# Patient Record
Sex: MALE | Race: WHITE | NOT HISPANIC OR LATINO | Employment: UNEMPLOYED | URBAN - NONMETROPOLITAN AREA
[De-identification: names, ages, dates, MRNs, and addresses within clinical notes are randomized per-mention and may not be internally consistent; named-entity substitution may affect disease eponyms.]

---

## 2017-06-12 ENCOUNTER — HOSPITAL ENCOUNTER (EMERGENCY)
Facility: HOSPITAL | Age: 47
Discharge: PSYCHIATRIC OR SUBSTANCE USE FACILITY | End: 2017-06-12
Attending: EMERGENCY MEDICINE | Admitting: EMERGENCY MEDICINE

## 2017-06-12 ENCOUNTER — HOSPITAL ENCOUNTER (INPATIENT)
Facility: HOSPITAL | Age: 47
LOS: 9 days | Discharge: HOME | End: 2017-06-21
Attending: PSYCHIATRY & NEUROLOGY | Admitting: PSYCHIATRY & NEUROLOGY

## 2017-06-12 VITALS
DIASTOLIC BLOOD PRESSURE: 91 MMHG | HEART RATE: 80 BPM | HEIGHT: 70 IN | SYSTOLIC BLOOD PRESSURE: 154 MMHG | BODY MASS INDEX: 31.5 KG/M2 | OXYGEN SATURATION: 98 % | WEIGHT: 220 LBS | TEMPERATURE: 98.5 F | RESPIRATION RATE: 20 BRPM

## 2017-06-12 DIAGNOSIS — F29 PSYCHOSIS, UNSPECIFIED PSYCHOSIS TYPE (HCC): Primary | ICD-10-CM

## 2017-06-12 LAB
6-ACETYL MORPHINE: NEGATIVE
ALBUMIN SERPL-MCNC: 4.3 G/DL (ref 3.5–5)
ALBUMIN/GLOB SERPL: 1.6 G/DL (ref 1.5–2.5)
ALP SERPL-CCNC: 64 U/L (ref 40–129)
ALT SERPL W P-5'-P-CCNC: 26 U/L (ref 10–44)
AMPHET+METHAMPHET UR QL: NEGATIVE
ANION GAP SERPL CALCULATED.3IONS-SCNC: 4.9 MMOL/L (ref 3.6–11.2)
AST SERPL-CCNC: 20 U/L (ref 10–34)
BARBITURATES UR QL SCN: NEGATIVE
BASOPHILS # BLD AUTO: 0.02 10*3/MM3 (ref 0–0.3)
BASOPHILS NFR BLD AUTO: 0.2 % (ref 0–2)
BENZODIAZ UR QL SCN: NEGATIVE
BILIRUB SERPL-MCNC: 0.5 MG/DL (ref 0.2–1.8)
BILIRUB UR QL STRIP: NEGATIVE
BUN BLD-MCNC: 11 MG/DL (ref 7–21)
BUN/CREAT SERPL: 13.9 (ref 7–25)
BUPRENORPHINE SERPL-MCNC: NEGATIVE NG/ML
CALCIUM SPEC-SCNC: 9.3 MG/DL (ref 7.7–10)
CANNABINOIDS SERPL QL: POSITIVE
CHLORIDE SERPL-SCNC: 106 MMOL/L (ref 99–112)
CLARITY UR: ABNORMAL
CO2 SERPL-SCNC: 30.1 MMOL/L (ref 24.3–31.9)
COCAINE UR QL: NEGATIVE
COLOR UR: ABNORMAL
CREAT BLD-MCNC: 0.79 MG/DL (ref 0.43–1.29)
DEPRECATED RDW RBC AUTO: 39.7 FL (ref 37–54)
EOSINOPHIL # BLD AUTO: 0.01 10*3/MM3 (ref 0–0.7)
EOSINOPHIL NFR BLD AUTO: 0.1 % (ref 0–5)
ERYTHROCYTE [DISTWIDTH] IN BLOOD BY AUTOMATED COUNT: 12.7 % (ref 11.5–14.5)
ETHANOL BLD-MCNC: <10 MG/DL
ETHANOL UR QL: <0.01 %
GFR SERPL CREATININE-BSD FRML MDRD: 106 ML/MIN/1.73
GLOBULIN UR ELPH-MCNC: 2.7 GM/DL
GLUCOSE BLD-MCNC: 115 MG/DL (ref 70–110)
GLUCOSE UR STRIP-MCNC: ABNORMAL MG/DL
HCT VFR BLD AUTO: 42.8 % (ref 42–52)
HGB BLD-MCNC: 14.5 G/DL (ref 14–18)
HGB UR QL STRIP.AUTO: NEGATIVE
IMM GRANULOCYTES # BLD: 0.02 10*3/MM3 (ref 0–0.03)
IMM GRANULOCYTES NFR BLD: 0.2 % (ref 0–0.5)
KETONES UR QL STRIP: ABNORMAL
LEUKOCYTE ESTERASE UR QL STRIP.AUTO: NEGATIVE
LYMPHOCYTES # BLD AUTO: 1.53 10*3/MM3 (ref 1–3)
LYMPHOCYTES NFR BLD AUTO: 16.4 % (ref 21–51)
MCH RBC QN AUTO: 29.4 PG (ref 27–33)
MCHC RBC AUTO-ENTMCNC: 33.9 G/DL (ref 33–37)
MCV RBC AUTO: 86.6 FL (ref 80–94)
MDMA UR QL SCN: NEGATIVE
METHADONE UR QL SCN: NEGATIVE
MONOCYTES # BLD AUTO: 0.93 10*3/MM3 (ref 0.1–0.9)
MONOCYTES NFR BLD AUTO: 10 % (ref 0–10)
NEUTROPHILS # BLD AUTO: 6.83 10*3/MM3 (ref 1.4–6.5)
NEUTROPHILS NFR BLD AUTO: 73.1 % (ref 30–70)
NITRITE UR QL STRIP: NEGATIVE
OPIATES UR QL: NEGATIVE
OSMOLALITY SERPL CALC.SUM OF ELEC: 281.6 MOSM/KG (ref 273–305)
OXYCODONE UR QL SCN: NEGATIVE
PCP UR QL SCN: NEGATIVE
PH UR STRIP.AUTO: 6 [PH] (ref 5–8)
PLATELET # BLD AUTO: 366 10*3/MM3 (ref 130–400)
PMV BLD AUTO: 9.4 FL (ref 6–10)
POTASSIUM BLD-SCNC: 3.2 MMOL/L (ref 3.5–5.3)
PROT SERPL-MCNC: 7 G/DL (ref 6–8)
PROT UR QL STRIP: ABNORMAL
RBC # BLD AUTO: 4.94 10*6/MM3 (ref 4.7–6.1)
SODIUM BLD-SCNC: 141 MMOL/L (ref 135–153)
SP GR UR STRIP: 1.03 (ref 1–1.03)
UROBILINOGEN UR QL STRIP: ABNORMAL
WBC NRBC COR # BLD: 9.34 10*3/MM3 (ref 4.5–12.5)

## 2017-06-12 PROCEDURE — HZ2ZZZZ DETOXIFICATION SERVICES FOR SUBSTANCE ABUSE TREATMENT: ICD-10-PCS | Performed by: PSYCHIATRY & NEUROLOGY

## 2017-06-12 RX ORDER — IBUPROFEN 400 MG/1
600 TABLET ORAL EVERY 6 HOURS PRN
Status: DISCONTINUED | OUTPATIENT
Start: 2017-06-12 | End: 2017-06-21 | Stop reason: HOSPADM

## 2017-06-12 RX ORDER — NICOTINE 21 MG/24HR
1 PATCH, TRANSDERMAL 24 HOURS TRANSDERMAL DAILY
Status: DISCONTINUED | OUTPATIENT
Start: 2017-06-12 | End: 2017-06-21 | Stop reason: HOSPADM

## 2017-06-12 RX ORDER — HYDROXYZINE 50 MG/1
50 TABLET, FILM COATED ORAL EVERY 6 HOURS PRN
Status: DISCONTINUED | OUTPATIENT
Start: 2017-06-12 | End: 2017-06-21 | Stop reason: HOSPADM

## 2017-06-12 RX ORDER — BENZTROPINE MESYLATE 1 MG/ML
0.5 INJECTION INTRAMUSCULAR; INTRAVENOUS DAILY PRN
Status: DISCONTINUED | OUTPATIENT
Start: 2017-06-12 | End: 2017-06-21 | Stop reason: HOSPADM

## 2017-06-12 RX ORDER — ALUMINA, MAGNESIA, AND SIMETHICONE 2400; 2400; 240 MG/30ML; MG/30ML; MG/30ML
15 SUSPENSION ORAL EVERY 6 HOURS PRN
Status: DISCONTINUED | OUTPATIENT
Start: 2017-06-12 | End: 2017-06-21 | Stop reason: HOSPADM

## 2017-06-12 RX ORDER — BENZTROPINE MESYLATE 1 MG/1
1 TABLET ORAL DAILY PRN
Status: DISCONTINUED | OUTPATIENT
Start: 2017-06-12 | End: 2017-06-21 | Stop reason: HOSPADM

## 2017-06-12 RX ORDER — POTASSIUM CHLORIDE 20 MEQ/1
TABLET, EXTENDED RELEASE ORAL
Status: COMPLETED
Start: 2017-06-12 | End: 2017-06-12

## 2017-06-12 RX ORDER — BENZONATATE 100 MG/1
100 CAPSULE ORAL 3 TIMES DAILY PRN
Status: DISCONTINUED | OUTPATIENT
Start: 2017-06-12 | End: 2017-06-21 | Stop reason: HOSPADM

## 2017-06-12 RX ORDER — LOPERAMIDE HYDROCHLORIDE 2 MG/1
2 CAPSULE ORAL 4 TIMES DAILY PRN
Status: DISCONTINUED | OUTPATIENT
Start: 2017-06-12 | End: 2017-06-21 | Stop reason: HOSPADM

## 2017-06-12 RX ORDER — ECHINACEA PURPUREA EXTRACT 125 MG
2 TABLET ORAL AS NEEDED
Status: DISCONTINUED | OUTPATIENT
Start: 2017-06-12 | End: 2017-06-21 | Stop reason: HOSPADM

## 2017-06-12 RX ORDER — FAMOTIDINE 20 MG/1
20 TABLET, FILM COATED ORAL 2 TIMES DAILY PRN
Status: DISCONTINUED | OUTPATIENT
Start: 2017-06-12 | End: 2017-06-21 | Stop reason: HOSPADM

## 2017-06-12 RX ORDER — POTASSIUM CHLORIDE 20 MEQ/1
20 TABLET, EXTENDED RELEASE ORAL ONCE
Status: COMPLETED | OUTPATIENT
Start: 2017-06-12 | End: 2017-06-12

## 2017-06-12 RX ORDER — ONDANSETRON 4 MG/1
4 TABLET, FILM COATED ORAL EVERY 6 HOURS PRN
Status: DISCONTINUED | OUTPATIENT
Start: 2017-06-12 | End: 2017-06-21 | Stop reason: HOSPADM

## 2017-06-12 RX ORDER — MULTIVITAMIN
1 TABLET ORAL DAILY
Status: DISCONTINUED | OUTPATIENT
Start: 2017-06-12 | End: 2017-06-14

## 2017-06-12 RX ORDER — TRAZODONE HYDROCHLORIDE 50 MG/1
50 TABLET ORAL NIGHTLY PRN
Status: DISCONTINUED | OUTPATIENT
Start: 2017-06-12 | End: 2017-06-20

## 2017-06-12 RX ADMIN — TRAZODONE HYDROCHLORIDE 50 MG: 50 TABLET ORAL at 22:36

## 2017-06-12 RX ADMIN — Medication 1 TABLET: at 22:17

## 2017-06-12 RX ADMIN — POTASSIUM CHLORIDE 20 MEQ: 1500 TABLET, EXTENDED RELEASE ORAL at 17:33

## 2017-06-12 RX ADMIN — POTASSIUM CHLORIDE 20 MEQ: 1500 TABLET, EXTENDED RELEASE ORAL at 17:32

## 2017-06-12 RX ADMIN — NICOTINE 1 PATCH: 21 PATCH TRANSDERMAL at 22:18

## 2017-06-12 RX ADMIN — IBUPROFEN 600 MG: 400 TABLET, FILM COATED ORAL at 22:17

## 2017-06-12 NOTE — NURSING NOTE
Patient pockets emptied. Thorough search complete. Undergarments searched by intake nurse and was witnessed by Bunny in secruity Per ED policy 100. The patient was placed in hospital attire. Belongings were logged on personal belongings sheet. Room was swept for any potential safety hazards room cleared and patient placed in treatment diamond

## 2017-06-12 NOTE — ED PROVIDER NOTES
Subjective   Patient is a 46 y.o. male presenting with mental health disorder.   History provided by:  Patient   used: No    Mental Health Problem   Presenting symptoms: agitation, bizarre behavior, depression and disorganized thought process    Patient accompanied by:  Family member  Degree of incapacity (severity):  Moderate  Onset quality:  Sudden  Duration:  1 day  Timing:  Constant  Progression:  Unchanged  Chronicity:  Recurrent  Context: drug abuse    Treatment compliance:  Untreated  Relieved by:  None tried  Ineffective treatments:  None tried  Associated symptoms: anxiety and feelings of worthlessness    Associated symptoms: no chest pain and no headaches    Risk factors: no hx of mental illness        Review of Systems   Constitutional: Negative for chills and fever.   HENT: Negative for congestion, ear pain and sore throat.    Respiratory: Negative for cough, shortness of breath and wheezing.    Cardiovascular: Negative for chest pain.   Gastrointestinal: Negative for diarrhea, nausea and vomiting.   Genitourinary: Negative for dysuria and flank pain.   Skin: Negative for rash.   Neurological: Negative for headaches.   Psychiatric/Behavioral: Positive for agitation and behavioral problems. The patient is nervous/anxious.    All other systems reviewed and are negative.      Past Medical History:   Diagnosis Date   • Substance abuse        No Known Allergies    Past Surgical History:   Procedure Laterality Date   • BACK SURGERY         History reviewed. No pertinent family history.    Social History     Social History   • Marital status:      Spouse name: N/A   • Number of children: N/A   • Years of education: N/A     Social History Main Topics   • Smoking status: Current Every Day Smoker     Packs/day: 3.00     Years: 25.00   • Smokeless tobacco: None   • Alcohol use Yes      Comment: occasional   • Drug use: Yes     Special: Benzodiazepines, Marijuana, Opium      Comment:  methadone   • Sexual activity: Defer     Other Topics Concern   • None     Social History Narrative   • None           Objective   Physical Exam   Constitutional: He is oriented to person, place, and time. He appears well-developed and well-nourished.   HENT:   Head: Normocephalic.   Mouth/Throat: Oropharynx is clear and moist.   Neck: Neck supple.   Cardiovascular: Normal rate and regular rhythm.    Pulmonary/Chest: Effort normal and breath sounds normal.   Abdominal: Soft. Bowel sounds are normal. There is no tenderness.   Musculoskeletal: Normal range of motion.   Neurological: He is alert and oriented to person, place, and time.   Skin: Skin is warm and dry.   Psychiatric: His behavior is normal. Judgment and thought content normal. His mood appears anxious. He exhibits a depressed mood.   Nursing note and vitals reviewed.      Procedures         ED Course  ED Course                  MDM    Final diagnoses:   Psychosis, unspecified psychosis type            NORA Ann  06/12/17 8978

## 2017-06-13 ENCOUNTER — APPOINTMENT (OUTPATIENT)
Dept: CT IMAGING | Facility: HOSPITAL | Age: 47
End: 2017-06-13

## 2017-06-13 PROBLEM — R41.0 ACUTE DELIRIUM: Status: ACTIVE | Noted: 2017-06-13

## 2017-06-13 PROBLEM — F33.3 SEVERE EPISODE OF RECURRENT MAJOR DEPRESSIVE DISORDER, WITH PSYCHOTIC FEATURES (HCC): Status: ACTIVE | Noted: 2017-06-13

## 2017-06-13 PROBLEM — F11.221: Status: ACTIVE | Noted: 2017-06-13

## 2017-06-13 PROBLEM — F33.3 SEVERE EPISODE OF RECURRENT MAJOR DEPRESSIVE DISORDER, WITH PSYCHOTIC FEATURES: Status: ACTIVE | Noted: 2017-06-13

## 2017-06-13 LAB — POTASSIUM BLD-SCNC: 3.8 MMOL/L (ref 3.5–5.3)

## 2017-06-13 PROCEDURE — 70450 CT HEAD/BRAIN W/O DYE: CPT

## 2017-06-13 PROCEDURE — 99223 1ST HOSP IP/OBS HIGH 75: CPT | Performed by: PSYCHIATRY & NEUROLOGY

## 2017-06-13 PROCEDURE — 93005 ELECTROCARDIOGRAM TRACING: CPT | Performed by: PSYCHIATRY & NEUROLOGY

## 2017-06-13 PROCEDURE — 93010 ELECTROCARDIOGRAM REPORT: CPT | Performed by: INTERNAL MEDICINE

## 2017-06-13 PROCEDURE — 70450 CT HEAD/BRAIN W/O DYE: CPT | Performed by: RADIOLOGY

## 2017-06-13 PROCEDURE — 84132 ASSAY OF SERUM POTASSIUM: CPT | Performed by: PSYCHIATRY & NEUROLOGY

## 2017-06-13 RX ORDER — OLANZAPINE 5 MG/1
5 TABLET, ORALLY DISINTEGRATING ORAL
Status: DISCONTINUED | OUTPATIENT
Start: 2017-06-13 | End: 2017-06-21 | Stop reason: HOSPADM

## 2017-06-13 RX ORDER — OLANZAPINE 5 MG/1
5 TABLET, ORALLY DISINTEGRATING ORAL 2 TIMES DAILY
Status: DISCONTINUED | OUTPATIENT
Start: 2017-06-13 | End: 2017-06-20

## 2017-06-13 RX ORDER — LORAZEPAM 2 MG/1
2 TABLET ORAL EVERY 8 HOURS
Status: COMPLETED | OUTPATIENT
Start: 2017-06-13 | End: 2017-06-14

## 2017-06-13 RX ADMIN — Medication 1 TABLET: at 09:55

## 2017-06-13 RX ADMIN — LORAZEPAM 2 MG: 2 TABLET ORAL at 21:07

## 2017-06-13 RX ADMIN — IBUPROFEN 600 MG: 400 TABLET, FILM COATED ORAL at 21:09

## 2017-06-13 RX ADMIN — HYDROXYZINE HYDROCHLORIDE 50 MG: 50 TABLET ORAL at 00:55

## 2017-06-13 RX ADMIN — FAMOTIDINE 20 MG: 20 TABLET, FILM COATED ORAL at 05:44

## 2017-06-13 RX ADMIN — NICOTINE 1 PATCH: 21 PATCH TRANSDERMAL at 09:56

## 2017-06-13 RX ADMIN — OLANZAPINE 5 MG: 5 TABLET, ORALLY DISINTEGRATING ORAL at 16:49

## 2017-06-13 RX ADMIN — TRAZODONE HYDROCHLORIDE 50 MG: 50 TABLET ORAL at 21:07

## 2017-06-13 RX ADMIN — OLANZAPINE 5 MG: 5 TABLET, ORALLY DISINTEGRATING ORAL at 11:00

## 2017-06-13 NOTE — PLAN OF CARE
Problem:  Patient Care Overview (Adult)  Goal: Plan of Care Review  Outcome: Ongoing (interventions implemented as appropriate)  Pt rated anxiety and depression a 10. Pt stated he was not suicidal or homicidal but had rambling speech and FOI. Pt stated he was not homicidal but he knew people judged him so he judged them and he didn't know if that meant harm or not. Pt stated he was hearing voices continuously and seeing smiley faces and knew good was battling evil.    06/13/17 1517   Coping/Psychosocial Response Interventions   Plan Of Care Reviewed With patient   Coping/Psychosocial   Patient Agreement with Plan of Care agrees   Patient Care Overview   Progress no change         Problem:  Overarching Goals  Goal: Adheres to Safety Considerations for Self and Others  Outcome: Ongoing (interventions implemented as appropriate)  Goal: Optimized Coping Skills in Response to Life Stressors  Outcome: Ongoing (interventions implemented as appropriate)  Goal: Develops/Participates in Therapeutic [place] to Support Successful Transition  Outcome: Ongoing (interventions implemented as appropriate)

## 2017-06-13 NOTE — PLAN OF CARE
Problem:  Patient Care Overview (Adult)  Goal: Individualization and Mutuality  Outcome: Ongoing (interventions implemented as appropriate)    06/13/17 0979   Behavioral Health Screens   Patient Personal Strengths motivated for treatment;motivated for recovery;stable living environment;spiritual/Buddhist support;family/social support;positive educational history   Patient Vulnerabilities Ineffective coping skills; substance misuse   Individualization   Patient Specific Goals Patient will identify 1-2 healthy coping skills prior to discharge   Patient Specific Interventions Patient will be evaluated for medications; will be recommended for residential services. Patient will be offered 1-4 individual sessions 20-30 minutes per day and daily groups.

## 2017-06-13 NOTE — H&P
"Donna Gold RN   Admission Date: 6/12/2017  12:12 PM 06/13/17      Subjective     Chief Complaint:  Confusion, disorganized thought, depression, suicidal ideation, substance abuse      HPI:  Maurice Puga is a 46 y.o. male who was admitted for complaints of confusion, disorganized thought, depression, suicidal ideation. Patient presented to [facility] reporting he's been experiencing suicidal ideation to overdose, thoughts of wanting to be dead for the past 30 days. Reports history of depression for a number of years. During the past few weeks he's experienced worsening symptoms of , low mood, low motivation, irritability, feelings of hopelessness, worthlessness. During the interview,  Patient displays confusion, disorganized thought process, difficulty with concentration.  Patient's affect is blunted, often repeating  what he is questioned, answering inappropriately, illogical responses.  Patient does report difficulty coping with history of substance abuse beginning with marijuana and street drugs in High School.  He admits lately he's been very confused, depression, feeling suicidal. Reports \" I'm just not in control of myself anymore\".  He says he was in the hospital in [place] about 2 weeks ago for detoxification for Benzodiazepine and Opiate.  UDS is positive for marijuana. Patient reports occasional alcohol use. He denies drug use at this time but does admit to  history of  abusing drugs, including using IV route  and alcohol.   He says he was in the hospital in [place] about 2 weeks ago for detoxification for Benzodiazepine and Opiate.         Interview is difficult to complete, reliability of information is questionable related to patient's mental status. Collateral information has been gathered from chart and family.  We were able to contact Patient's Mother who states Patient has long history of drug abuse. She reported one previous inpatient treatment as an Adolescent r/t marijuana use. She " "says the patient has history of psychiatric treatment as an  Adult on outpatient basis with possible Diagnosis of Bipolar. Apparently she's had little contact with him in recent years and was unable to provide current information. We will continue to attempt to contact the patient's Wife.  Upon the Unit patient has admitted to hearing derogatory voices saying \" Look at what you've cause\". He also has  reported paranoid thought, feeling as if people at work have \"been out to get him\", talking about him.  He did initially report to staff he has experienced increased stressors, reporting his relationship strain with Wife and  Son has Leukemia.   He denies any homicidal ideations. He's reports poor sleep and appetite Denies hallucinations at this time. He was admitted to the Adult Psychiatric Unit for safety and further stabilization. Left message for his wife to call us.       Past Psych History: Patient reports history of recent inpatient detoxification treatment in [place], 2 weeks ago. Collateral information gather from Mother who reported at least 1 inpatient hospitalization as an Adolescent rt marijuana use.  Patient's Mother also reported he had outpatient treatment as an Adult with possible diagnosis of Bipolar.      Substance Abuse: UDS is positive for marijuana. Patient denies current use of drugs. Reported occasional alcohol use . Reports he was in an inpatient detoxification program about 2 weeks ago in [place].  He does admit to history of marijuana, Opiate, benzo, methadone.  Use has included IV route. Reportedly, Patient's began misusing  Opiates following back surgery in his 30's.      Family History: Depression in his mother. M,Grandfather alcohol,  Brother, alcohol. No history of mental illness or suicide.     Personal and social history   Patient is  has a 15 year old Daughter, Swathi and a 10 year old Son. He is high school educated, 5 years of college. 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 He states he grew up in [place] with Parents. His Father is now .  Denies legal history. Denies  history . Christian preference is Restorationism.   Medical/Surgical History:  Past Medical History:   Diagnosis Date   • Anxiety    • Chronic pain disorder     back pain   • Depression    • Substance abuse      Past Surgical History:   Procedure Laterality Date   • BACK SURGERY         No Known Allergies  Social History   Substance Use Topics   • Smoking status: Current Every Day Smoker     Packs/day: 3.00     Years: 25.00     Types: Cigarettes   • Smokeless tobacco: Current User   • Alcohol use Yes      Comment: occasional     Current Medications:   Current Facility-Administered Medications   Medication Dose Route Frequency Provider Last Rate Last Dose   • aluminum-magnesium hydroxide-simethicone (MAALOX MAX) 400-400-40 MG/5ML suspension 15 mL  15 mL Oral Q6H PRN Sean Ku MD       • benzonatate (TESSALON) capsule 100 mg  100 mg Oral TID PRN Sean Ku MD       • benztropine (COGENTIN) tablet 1 mg  1 mg Oral Daily PRN Sean Ku MD        Or   • benztropine (COGENTIN) injection 0.5 mg  0.5 mg Intramuscular Daily PRN Sean Ku MD       • famotidine (PEPCID) tablet 20 mg  20 mg Oral BID PRN Sean Ku MD   20 mg at 17 0544   • hydrOXYzine (ATARAX) tablet 50 mg  50 mg Oral Q6H PRN Sean Ku MD   50 mg at 17 0055   • ibuprofen (ADVIL,MOTRIN) tablet 600 mg  600 mg Oral Q6H PRN Sean Ku MD   600 mg at 17 2217   • loperamide (IMODIUM) capsule 2 mg  2 mg Oral 4x Daily PRN Sean Ku MD       • magnesium hydroxide (MILK OF MAGNESIA) suspension 2400 mg/10mL 10 mL  10 mL Oral Daily PRN Sean Ku MD       • multivitamin (DAILY VICENTE) tablet 1 tablet  1 tablet Oral Daily Sean Ku MD   1 tablet at 17 0955   • nicotine (NICODERM CQ) 21 MG/24HR patch 1 patch  1  patch Transdermal Daily Sean Ku MD   1 patch at 06/13/17 0956   • OLANZapine zydis (zyPREXA) disintegrating tablet 5 mg  5 mg Oral BID Ranjan King MD   5 mg at 06/13/17 1100   • OLANZapine zydis (zyPREXA) disintegrating tablet 5 mg  5 mg Oral Q48H PRN Ranjan King MD       • ondansetron (ZOFRAN) tablet 4 mg  4 mg Oral Q6H PRN Sean Ku MD       • pneumococcal polysaccharide 23-valent (PNEUMOVAX-23) vaccine 0.5 mL  0.5 mL Intramuscular During Hospitalization Sean Ku MD       • sodium chloride (OCEAN) nasal spray 2 spray  2 spray Each Nare PRN Sean Ku MD       • traZODone (DESYREL) tablet 50 mg  50 mg Oral Nightly PRN Sean Ku MD   50 mg at 06/12/17 2236       Review of Systems    Review of Systems - General ROS: negative for - chills, fever or malaise  Ophthalmic ROS: negative for - loss of vision  ENT ROS: negative for - hearing change  Allergy and Immunology ROS: negative for - hives  Hematological and Lymphatic ROS: negative for - bleeding problems  Endocrine ROS: negative for - skin changes  Respiratory ROS: no cough, shortness of breath, or wheezing  Cardiovascular ROS: no chest pain or dyspnea on exertion  Gastrointestinal ROS: no abdominal pain, change in bowel habits, or black or bloody stools  Genito-Urinary ROS: no dysuria, trouble voiding, or hematuria  Musculoskeletal ROS: negative for - gait disturbance  Neurological ROS: no TIA or stroke symptoms  Dermatological ROS: negative for rash    Objective       General Appearance:    Alert, cooperative, in no acute distress   Head:    Normocephalic, without obvious abnormality, atraumatic   Eyes:            Lids and lashes normal, conjunctivae and sclerae normal, no   icterus, no pallor, corneas clear, PERRLA   Ears:    Ears appear intact with no abnormalities noted   Throat:   No oral lesions, no thrush, oral mucosa moist   Neck:   No adenopathy, supple, trachea  "midline, no thyromegaly, no   carotid bruit, no JVD   Back:     No kyphosis present, no scoliosis present, no skin lesions,      erythema or scars, no tenderness to percussion or                   palpation,   range of motion normal   Lungs:     Clear to auscultation,respirations regular, even and                  unlabored    Heart:    Regular rhythm and normal rate, normal S1 and S2, no            murmur, no gallop, no rub, no click   Chest Wall:    No abnormalities observed   Abdomen:     Normal bowel sounds, no masses, no organomegaly, soft        non-tender, non-distended, no guarding, no rebound                tenderness   Rectal:     Deferred   Extremities:   Moves all extremities well, no edema, no cyanosis, no             redness   Pulses:   Pulses palpable and equal bilaterally   Skin:   No bleeding, bruising or rash   Lymph nodes:   No palpable adenopathy   Neurologic:   Cranial nerves 2 - 12 grossly intact, sensation intact, DTR       present and equal bilaterally       Blood pressure 149/86, pulse 51, temperature 98 °F (36.7 °C), temperature source Temporal Artery , resp. rate 16, height 70\" (177.8 cm), weight 199 lb 9.6 oz (90.5 kg), SpO2 98 %.    Mental Status Exam:   Hygiene:   fair  Cooperation:  Cooperative  Eye Contact:  Fair  Psychomotor Behavior:  Appropriate  Affect:  Blunted  Hopelessness: Denies  Speech:  Pressured  Thought Process:  Disorganized and Pressured  Thought Content:  Mood congurent  Suicidal:  None  Homicidal:  None  Hallucinations:  None  Delusion:  None  Memory:  Deficits  Orientation:  Person  Reliability:  poor  Insight:  Poor  Judgement:  Poor  Impulse Control:  Poor  Physical/Medical Issues:  No     Medical Decision Making:              Labs:                  Medications:                multivitamin 1 tablet Oral Daily   nicotine 1 patch Transdermal Daily   OLANZapine zydis 5 mg Oral BID                  •  aluminum-magnesium hydroxide-simethicone  •  benzonatate  •  " benztropine **OR** benztropine  •  famotidine  •  hydrOXYzine  •  ibuprofen  •  loperamide  •  magnesium hydroxide  •  OLANZapine zydis  •  ondansetron  •  pneumococcal polysaccharide 23-valent  •  sodium chloride  •  traZODone   All medications reviewed.    Special Precautions: Continue current level of Special Precautions.        Assessment/Plan     Patient Active Problem List   Diagnosis Code   • Acute delirium R41.0   • Polysubstance dependence including opioid type drug, continuous use, with delirium F11.221   • Severe episode of recurrent major depressive disorder, with psychotic features F33.3     The patient has been admitted to the [facility] for safety and symptom stabilization. The patient has been given routine orders and placed on special precautions. The patient will be assigned a Master Level Therapist.  A Psychiatrist  will assess the patient daily and work with the treatment team to develop a plan of care.       We discussed risks, benefits, and side effects of the above medication and the patient was agreeable with the plan.             Attestation:  I Donna Gold RN acted as scribe for Dr. MAHAD Phoenix                Physician Attestation: I attest that the above note accurately reflects work and decisions made by me.  KYM PHOENIX MD

## 2017-06-13 NOTE — PLAN OF CARE
Problem:  Patient Care Overview (Adult)  Goal: Discharge Needs Assessment  Outcome: Ongoing (interventions implemented as appropriate)    06/13/17 3803   Discharge Needs Assessment   Concerns To Be Addressed substance/tobacco abuse/use concerns;suicidal concerns   Community Agency Name(S) Patient is recommended for residential services   Current Discharge Risk substance abuse;psychiatric illness   Discharge Planning Comments Patient has insurance and should be able to access medications at discharge.   Discharge Needs Assessment   Outpatient/Agency/Support Group Needs residential services   Anticipated Discharge Disposition home with family  (Residential services)   Discharge Disposition home with family  (Residential services)   Living Environment   Transportation Available family or friend will provide         Data: Met with patient and introduced myself as therapist.  Patient was agreeable.  Patient is somewhat difficult to assess today due to confusion and disorganized thoughts.  Patient presents to the emergency room with suicidal ideation with a plan to overdose.  Patient has had thoughts of dying for the past 30 days.  Patient has suffered from depression for many years.  Patient has a history of substance abuse.  He has used IV that primarily snorts methadone; opiates; benzos.  Patient gave consent for me to contact his wife.  I contacted her and she stated that the patient went to a detox facility in [place] on May 24 and was discharged on May 31.  She stated that he was using 120 mg of methadone and was buying this off the street as well as up to 7 Xanax as per day. she does not know the milligrams.  She states that when he left the hospital they prescribed him benzos. the last benzos he took was on June 5.  She states that the patient went back to work on June 7 and was very stressed out after one day of work.  She states that he has always had a sharp mind that has most recently been feeling foggy and  having difficulty with his memory.  She states that he also reported blurred vision.  She stated that he has been sleep deprived getting approximately 1 hour at a time and only 3 hours per day.  I inquired if he had been using any alcohol recently and she stated that he had not been using any alcohol.  She did state that he uses the benzos from daylight to dark.  I'm concerned that he may possibly have be having  benzo withdrawal.  She stated that the patient has always suffered from anxiety.  She states that he does worry about their business.  She states that he also has family issues with his parents.  States that they have pretty much abandoned him due to his drug usage.  States that his mom is more of a trigger for him to use than a help.  States that the patient has a fear of them disowning him due to his illness.     Assessment: Patient continues to have disorganized thought pattern and appears to be confused.  Patient has blunted affect.  Patient often has difficulty following the conversation.  Patient has a long history of substance usage.  Patient feels paranoid and thinks that people are out to get him.  Spoke with patient's wife who stated that she is supportive and concerned for her spouse.     Plan: Patient remain the hospital for safety precautions.  Communicate with patient's family for collateral and discharge planning.  Will recommend residential treatment for the patient.

## 2017-06-14 PROCEDURE — 99232 SBSQ HOSP IP/OBS MODERATE 35: CPT | Performed by: PSYCHIATRY & NEUROLOGY

## 2017-06-14 RX ORDER — CHLORDIAZEPOXIDE HYDROCHLORIDE 25 MG/1
25 CAPSULE, GELATIN COATED ORAL 3 TIMES DAILY
Status: COMPLETED | OUTPATIENT
Start: 2017-06-14 | End: 2017-06-15

## 2017-06-14 RX ORDER — MULTIVITAMIN
1 TABLET ORAL DAILY
Status: DISCONTINUED | OUTPATIENT
Start: 2017-06-14 | End: 2017-06-21 | Stop reason: HOSPADM

## 2017-06-14 RX ORDER — CHLORDIAZEPOXIDE HYDROCHLORIDE 25 MG/1
25 CAPSULE, GELATIN COATED ORAL 3 TIMES DAILY
Status: DISCONTINUED | OUTPATIENT
Start: 2017-06-14 | End: 2017-06-14

## 2017-06-14 RX ORDER — THIAMINE HCL 50 MG
100 TABLET ORAL DAILY
Status: DISCONTINUED | OUTPATIENT
Start: 2017-06-14 | End: 2017-06-21 | Stop reason: HOSPADM

## 2017-06-14 RX ADMIN — THIAMINE HCL (VITAMIN B1) 50 MG TABLET 100 MG: at 09:32

## 2017-06-14 RX ADMIN — OLANZAPINE 5 MG: 5 TABLET, ORALLY DISINTEGRATING ORAL at 17:11

## 2017-06-14 RX ADMIN — LORAZEPAM 2 MG: 2 TABLET ORAL at 03:51

## 2017-06-14 RX ADMIN — CHLORDIAZEPOXIDE HYDROCHLORIDE 25 MG: 25 CAPSULE ORAL at 21:10

## 2017-06-14 RX ADMIN — CHLORDIAZEPOXIDE HYDROCHLORIDE 25 MG: 25 CAPSULE ORAL at 09:32

## 2017-06-14 RX ADMIN — CHLORDIAZEPOXIDE HYDROCHLORIDE 25 MG: 25 CAPSULE ORAL at 15:16

## 2017-06-14 RX ADMIN — OLANZAPINE 5 MG: 5 TABLET, ORALLY DISINTEGRATING ORAL at 08:38

## 2017-06-14 RX ADMIN — TRAZODONE HYDROCHLORIDE 50 MG: 50 TABLET ORAL at 21:10

## 2017-06-14 RX ADMIN — ALUMINUM HYDROXIDE, MAGNESIUM HYDROXIDE, AND DIMETHICONE 15 ML: 400; 400; 40 SUSPENSION ORAL at 05:55

## 2017-06-14 RX ADMIN — Medication 1 TABLET: at 09:32

## 2017-06-14 RX ADMIN — NICOTINE 1 PATCH: 21 PATCH TRANSDERMAL at 08:37

## 2017-06-14 RX ADMIN — Medication 1 TABLET: at 08:37

## 2017-06-14 NOTE — PLAN OF CARE
"Problem:  Patient Care Overview (Adult)  Goal: Interdisciplinary Rounds/Family Conference  Outcome: Ongoing (interventions implemented as appropriate)    06/14/17 1053   Interdisciplinary Rounds/Family Conf   Summary Staffed case with Dr King on this date. Patient appears to be suffering from delirium from benzo usage. Patient continues to be disorganized and paranoid on this date. Wife is supportive and will continue to communicate with her about the patient's progress.   Participants social work;nursing;psychiatrist         Data: Met with patient one-on-one continues to have disorganized thought pattern.  Patient has difficulty concentrating and remembering things even his basic is what he had for breakfast this morning.  When I asked the patient if he was feeling paranoid at all he said \"of course I'm being judged\" \"I tell patient that he was not being judged by anyone here and he said well \"at some point will be judged\".  Patient also told me when he entered the room that he heard the voice of his mother.  Patient states that he is embarased because he is having hallucinations.  Provided emotional support for the patient.     Contacted the patient's wife to give an update.  She stated that this time the patient is not to be allowed to come back home and that he is going to have to go to 30 day rehabilitation.  She stated that they have been in contact with [facility] in [place] that she is willing to see and him wherever he needs to go that would be an official for him and she doesn't care about the cost.     Plan: Patient will continue with medications to help with delirium with hallucinations.  I will continue to speak with the patient's wife for collateral.  Will also recommend residential treatment when patient is more coherent.      "

## 2017-06-14 NOTE — PROGRESS NOTES
"      PROGRESS NOTE  Clinician: KYM King MD  Admission Date: 6/12/2017  8:11 AM 06/14/17    Behavioral Health Treatment Plan and Problem List: I have reviewed and approved the Behavioral Health Treatment Plan and Problem list.    Allergies  No Known Allergies    Hospital Day: 2 days      Assessment completed within view of staff    History    CC: \"Feel better\"     Followed for: Acute dementia (withdrawal )/ Depression.     Interval HPI: Patient seen and evaluated by me.  Chart reviewed. Reviewed therapist note, seems BZ much more of an issue than previously thought. Up early this morning, rambling thoughts, says not depression, paranoid delusions and perceptions, oriented to time, place and person \"talking to doctor what your name\", active auditory and visual hallucination ('sun light bunching off my eye lashes\").       Evasive, avoiding mentioning his CD  Issue. His thoughts are disorganized, having trouble finding the next word or thoughts, almost like an expressive aphasia.   We'll continue with both the Zyprexa and lorazepam detox.    Interval Review of Systems: he  General ROS: negative for - fever or malaise  Endocrine ROS: negative for - palpitations  Respiratory ROS: no cough, shortness of breath, or wheezing  Cardiovascular ROS: no chest pain or dyspnea on exertion  Gastrointestinal ROS: no abdominal pain,no black or bloody stools    /85 (BP Location: Right arm, Patient Position: Lying)  Pulse 59  Temp 98.1 °F (36.7 °C) (Temporal Artery )   Resp 20  Ht 70\" (177.8 cm)  Wt 199 lb 9.6 oz (90.5 kg)  SpO2 98%  BMI 28.64 kg/m2    Mental Status Exam    Mood/Affect: depressed  Thought Processes:  disorganized, barely coherrent. words without conviction or commitment.  Echos clichés from prior treatment interventions.  Thought Content:  dilussional, paranoid and distorted  Hallucination(s): auditory and visual  Hopelessness: yes  Optimistic:minimally  Suicidal Thoughts:  none  Suicidal " Plan/Intent: none  Homicidal Thoughts:  absent      Medical Decision Making:   All recent completed LAB results reviewed and discussed with the patient.        Radiology:     Imaging Results (last 24 hours)     Procedure Component Value Units Date/Time    CT Head Without Contrast [[id number]] Collected:  06/13/17 1023     Updated:  06/13/17 1026    Narrative:       CT HEAD WO CONTRAST-     REASON FOR EXAM: mental status change      The ventricular system and subarachnoid spaces of the brain were  appropriate for age. There is no evidence of mass effect or midline  shift. No acute areas of hemorrhage or infarct are identified. There is  no evidence of subdural or epidural hematoma. No focal brain parenchymal  abnormalities were identified.       Impression:       Negative noncontrasted cranial CT. A source for the  patient's acute mental status change is not identified.           The radiation dose reduction device was utilized for each scan per the  ALARA (as low as reasonably achievable) protocol.     This report was finalized on 6/13/2017 10:24 AM by Dr. Jason Osborn II, MD.               EKG:     ECG/EMG Results (most recent)     Procedure Component Value Units Date/Time    ECG 12 Lead [[id number]] Collected:  06/13/17 1537     Updated:  06/13/17 1747    Narrative:       Test Reason : baseline  Blood Pressure : **/** mmHG  Vent. Rate : 053 BPM     Atrial Rate : 053 BPM     P-R Int : 152 ms          QRS Dur : 092 ms      QT Int : 442 ms       P-R-T Axes : 027 045 -12 degrees     QTc Int : 414 ms    Sinus bradycardia  Otherwise normal ECG  No previous ECGs available  Confirmed by Capri Treadwell ([id number]) on 6/13/2017 5:47:21 PM    Referred By:  LIZETT           Confirmed By:Capri Treadwell           Medications:     multivitamin 1 tablet Oral Daily   nicotine 1 patch Transdermal Daily   OLANZapine zydis 5 mg Oral BID       •  aluminum-magnesium hydroxide-simethicone  •  benzonatate  •  benztropine **OR**  benztropine  •  famotidine  •  hydrOXYzine  •  ibuprofen  •  loperamide  •  magnesium hydroxide  •  OLANZapine zydis  •  ondansetron  •  pneumococcal polysaccharide 23-valent  •  sodium chloride  •  traZODone   All medications reviewed.    Special Precautions: Continue current level of Special Precautions.    Assessment/Diagnosis: Active Problems:    Acute delirium    Polysubstance dependence including opioid type drug, continuous use, with delirium    Severe episode of recurrent major depressive disorder, with psychotic features      Treatment Plan: Continue current treatment plan.    Attestation:   Physician Attestation: I attest that the above note accurately reflects work and decisions made by me.

## 2017-06-14 NOTE — PLAN OF CARE
Problem: BH Patient Care Overview (Adult)  Goal: Plan of Care Review  Outcome: Ongoing (interventions implemented as appropriate)  Pt calm and cooperative this shift. Rates anxiety and depression a 0. Denies SI/H. Reports that he see's things floating around like science.    06/14/17 9958   Coping/Psychosocial Response Interventions   Plan Of Care Reviewed With patient   Coping/Psychosocial   Patient Agreement with Plan of Care agrees   Patient Care Overview   Progress improving         Problem:  Overarching Goals  Goal: Adheres to Safety Considerations for Self and Others  Outcome: Ongoing (interventions implemented as appropriate)  Goal: Optimized Coping Skills in Response to Life Stressors  Outcome: Ongoing (interventions implemented as appropriate)  Goal: Develops/Participates in Therapeutic [place] to Support Successful Transition  Outcome: Ongoing (interventions implemented as appropriate)

## 2017-06-14 NOTE — PLAN OF CARE
Problem:  Patient Care Overview (Adult)  Goal: Plan of Care Review  Outcome: Ongoing (interventions implemented as appropriate)    06/14/17 0417   Coping/Psychosocial Response Interventions   Plan Of Care Reviewed With patient   Coping/Psychosocial   Patient Agreement with Plan of Care agrees   Outcome Evaluation   Outcome Summary/Follow up Plan Patient calm and cooperative this shift. Rates anxiety and depression a 8/10. Denies SI/HI. Reports auditory hallucinations.    Patient Care Overview   Progress no change         Problem:  Overarching Goals  Goal: Adheres to Safety Considerations for Self and Others  Outcome: Ongoing (interventions implemented as appropriate)  Goal: Optimized Coping Skills in Response to Life Stressors  Outcome: Ongoing (interventions implemented as appropriate)  Goal: Develops/Participates in Therapeutic [place] to Support Successful Transition  Outcome: Ongoing (interventions implemented as appropriate)

## 2017-06-15 PROBLEM — F13.20 BENZODIAZEPINE DEPENDENCE: Status: ACTIVE | Noted: 2017-06-15

## 2017-06-15 PROBLEM — F31.64: Status: ACTIVE | Noted: 2017-06-13

## 2017-06-15 PROBLEM — F11.23 OPIOID DEPENDENCE WITH WITHDRAWAL: Status: ACTIVE | Noted: 2017-06-13

## 2017-06-15 PROBLEM — F13.931 BENZODIAZEPINE WITHDRAWAL WITH DELIRIUM (HCC): Status: ACTIVE | Noted: 2017-06-13

## 2017-06-15 PROBLEM — F13.931 BENZODIAZEPINE WITHDRAWAL WITH DELIRIUM (HCC): Status: ACTIVE | Noted: 2017-06-15

## 2017-06-15 PROBLEM — F12.10 CANNABIS ABUSE: Status: ACTIVE | Noted: 2017-06-15

## 2017-06-15 PROCEDURE — 99232 SBSQ HOSP IP/OBS MODERATE 35: CPT | Performed by: PSYCHIATRY & NEUROLOGY

## 2017-06-15 RX ORDER — DIVALPROEX SODIUM 500 MG/1
500 TABLET, EXTENDED RELEASE ORAL NIGHTLY
Status: DISCONTINUED | OUTPATIENT
Start: 2017-06-15 | End: 2017-06-21 | Stop reason: HOSPADM

## 2017-06-15 RX ADMIN — NICOTINE 1 PATCH: 21 PATCH TRANSDERMAL at 08:11

## 2017-06-15 RX ADMIN — CHLORDIAZEPOXIDE HYDROCHLORIDE 25 MG: 25 CAPSULE ORAL at 16:13

## 2017-06-15 RX ADMIN — OLANZAPINE 5 MG: 5 TABLET, ORALLY DISINTEGRATING ORAL at 17:47

## 2017-06-15 RX ADMIN — TRAZODONE HYDROCHLORIDE 50 MG: 50 TABLET ORAL at 20:24

## 2017-06-15 RX ADMIN — CHLORDIAZEPOXIDE HYDROCHLORIDE 25 MG: 25 CAPSULE ORAL at 08:13

## 2017-06-15 RX ADMIN — IBUPROFEN 600 MG: 400 TABLET, FILM COATED ORAL at 07:05

## 2017-06-15 RX ADMIN — METOPROLOL TARTRATE 25 MG: 25 TABLET, FILM COATED ORAL at 21:52

## 2017-06-15 RX ADMIN — CHLORDIAZEPOXIDE HYDROCHLORIDE 25 MG: 25 CAPSULE ORAL at 20:24

## 2017-06-15 RX ADMIN — OLANZAPINE 5 MG: 5 TABLET, ORALLY DISINTEGRATING ORAL at 08:11

## 2017-06-15 RX ADMIN — THIAMINE HCL (VITAMIN B1) 50 MG TABLET 100 MG: at 08:11

## 2017-06-15 RX ADMIN — DIVALPROEX SODIUM 500 MG: 500 TABLET, FILM COATED, EXTENDED RELEASE ORAL at 20:24

## 2017-06-15 RX ADMIN — Medication 1 TABLET: at 08:11

## 2017-06-15 NOTE — PROGRESS NOTES
"3    ID:aMurice Puga is a 46 y.o. male    CC: \"just struggling\"     HPI: He reviewed the H&P and progress notes from Dr. King.  The patient has had issues with polysubstance dependence however went through a recent detox.  UDS on admission was positive for THC only.  His PPD has been elevated but heart rate has been stable.  Today, the patient reports his last use of a benzo was over a week ago.  Many of his responses were odd for example when asked about what this place was called he said \"a place of healing.\"  He was oriented ×4.  He then started rambling about how he needed to get things right with his wife.  He denied auditory or visual hallucinations but mentioned that he has hallucinations \"during my dreams.\"  He reports racing thoughts, decreased need for sleep, increased impulsivity along with worsening depression and anxiety recently.    Depression rating 10/10  Anxiety rating 10/10  Sleep:reports 4 hours    On the way to the office, the patient had to be redirected several times to come to the appropriate place frequently distracted by others or spontaneously returning to his room.    Review of Systems   Cardiovascular: Negative.    Gastrointestinal: Negative.    Musculoskeletal: Positive for back pain.   Neurological: Negative.        Temp:  [97.4 °F (36.3 °C)-97.8 °F (36.6 °C)] 97.4 °F (36.3 °C)  Heart Rate:  [73-96] 79  Resp:  [16-18] 16  BP: (142-150)/(90-96) 142/90    MENTAL STATUS EXAM:  Appearance:Neatly, casually dressed, good hygeine.   Cooperation:Cooperative  Orientation: Ox4  Gait and station stable.   Psychomotor: No psychomotor agitation/retardation, No EPS, No motor tics  Speech- low volume, talkative but   Mood \"stressed\"   Affect-constricted  Thought Content- disorganized, slightly paranoid  Thought process- circumstantial   Suicidality: No SI  Homicidality: No HI  Perception: No AH/VH  Memory is intact for recent and remote events  Concentration: poor  Impulse " control-good  Insight-limited  Judgement- limited    Lab Results (last 24 hours)     ** No results found for the last 24 hours. **          ALLERGIES: Review of patient's allergies indicates no known allergies.      Current Facility-Administered Medications:   •  aluminum-magnesium hydroxide-simethicone (MAALOX MAX) 400-400-40 MG/5ML suspension 15 mL, 15 mL, Oral, Q6H PRN, Sean Ku MD, 15 mL at 06/14/17 0555  •  benzonatate (TESSALON) capsule 100 mg, 100 mg, Oral, TID PRN, Sean Ku MD  •  benztropine (COGENTIN) tablet 1 mg, 1 mg, Oral, Daily PRN **OR** benztropine (COGENTIN) injection 0.5 mg, 0.5 mg, Intramuscular, Daily PRN, Sean Ku MD  •  chlordiazePOXIDE (LIBRIUM) capsule 25 mg, 25 mg, Oral, TID, Ranjan King MD, 25 mg at 06/15/17 0813  •  famotidine (PEPCID) tablet 20 mg, 20 mg, Oral, BID PRN, Sean Ku MD, 20 mg at 06/13/17 0544  •  hydrOXYzine (ATARAX) tablet 50 mg, 50 mg, Oral, Q6H PRN, Sean Ku MD, 50 mg at 06/13/17 0055  •  ibuprofen (ADVIL,MOTRIN) tablet 600 mg, 600 mg, Oral, Q6H PRN, Sean Ku MD, 600 mg at 06/15/17 0705  •  loperamide (IMODIUM) capsule 2 mg, 2 mg, Oral, 4x Daily PRN, Sean Ku MD  •  magnesium hydroxide (MILK OF MAGNESIA) suspension 2400 mg/10mL 10 mL, 10 mL, Oral, Daily PRN, Sean Ku MD  •  multivitamin (DAILY VICENTE) tablet 1 tablet, 1 tablet, Oral, Daily, Ranjan King MD, 1 tablet at 06/15/17 0811  •  nicotine (NICODERM CQ) 21 MG/24HR patch 1 patch, 1 patch, Transdermal, Daily, Sean Ku MD, 1 patch at 06/15/17 0811  •  OLANZapine zydis (zyPREXA) disintegrating tablet 5 mg, 5 mg, Oral, BID, Ranjan King MD, 5 mg at 06/15/17 0811  •  OLANZapine zydis (zyPREXA) disintegrating tablet 5 mg, 5 mg, Oral, Q48H PRN, Ranjan King MD  •  ondansetron (ZOFRAN) tablet 4 mg, 4 mg, Oral, Q6H PRN, Sean Ku MD  •  pneumococcal  polysaccharide 23-valent (PNEUMOVAX-23) vaccine 0.5 mL, 0.5 mL, Intramuscular, During Hospitalization, Sean Ku MD  •  sodium chloride (OCEAN) nasal spray 2 spray, 2 spray, Each Nare, PRN, Sean Ku MD  •  traZODone (DESYREL) tablet 50 mg, 50 mg, Oral, Nightly PRN, Sean Ku MD, 50 mg at 06/14/17 2110  •  vitamin B-1 tablet 100 mg, 100 mg, Oral, Daily, Ranjan King MD, 100 mg at 06/15/17 0811      SAFETY PRECAUTIONS: SP LEVEL III    ASSESSMENT/PLAN:  Patient Active Problem List   Diagnosis   • Delirium   • Opioid dependence with withdrawal   • Bipolar I, most recent episode mixed, severe with psychotic behavior   • Benzodiazepine dependence   • Cannabis abuse         Plan: Continue hospitalization for safety and stabilization.  The patient is still disorganized.  On review of the patient's vitals and the history provided, it appears that the patient may not be delirious from benzodiazepine withdrawal or other substance use withdrawal; however it appears that symptoms may be related to a mixed leda.  The patient's agreeable to a trial of Depakote  mg tonight.  We will continue Zyprexa for now to also help with mood stabilization.      I have reviewed the treatment plan and agree with current plan.  Treatment was discussed with the patient who is agreeable to this treatment and plan.

## 2017-06-15 NOTE — PROGRESS NOTES
Therapist completed referrals on this date to [facility], [facility], [facility] and [facility] on this date.

## 2017-06-15 NOTE — PLAN OF CARE
"Problem: BH Patient Care Overview (Adult)  Goal: Plan of Care Review  Outcome: Ongoing (interventions implemented as appropriate)  Patient reports good appetite and fair sleep last night. Reports anxiety at 10/10 with no depression reported, Patient reports continued paranoia and when questioned about A/V hallucinations he stated \"Only with the nightmares about my loved ones.\" Patient reports cravings at 10/10 and withdrawal symptoms of leg cramps, fatigue and body aches. Will continue to monitor.    06/15/17 1503   Coping/Psychosocial Response Interventions   Plan Of Care Reviewed With patient   Coping/Psychosocial   Patient Agreement with Plan of Care agrees   Patient Care Overview   Progress improving       Goal: Interdisciplinary Rounds/Family Conference  Outcome: Ongoing (interventions implemented as appropriate)  Goal: Individualization and Mutuality  Outcome: Ongoing (interventions implemented as appropriate)  Goal: Discharge Needs Assessment  Outcome: Ongoing (interventions implemented as appropriate)    Problem:  Overarching Goals  Goal: Adheres to Safety Considerations for Self and Others  Outcome: Ongoing (interventions implemented as appropriate)  Goal: Optimized Coping Skills in Response to Life Stressors  Outcome: Ongoing (interventions implemented as appropriate)  Goal: Develops/Participates in Therapeutic [place] to Support Successful Transition  Outcome: Ongoing (interventions implemented as appropriate)      "

## 2017-06-15 NOTE — PROGRESS NOTES
Received a call from Ev from [facility] who reports that their psychiatrist does not feel that they can meet his psychiatric needs at this time.

## 2017-06-15 NOTE — DISCHARGE PLACEMENT REQUEST
"Maurice Puga (46 y.o. Male)     Date of Birth Social Security Number Address Home Phone MRN    1970  [address] [place] [address] [phone] [id number]    Syn[employer] Marital Status          None        Admission Date Admission Type Admitting Provider Attending Provider Department, Room/Bed    6/12/17 Emergency Sean Ku MD [place], Ranjan Gomez MD [facility] ADULT PSYCHIATRIC 2, 1043/1S    Discharge Date Discharge Disposition Discharge Destination                      Attending Provider: Ranjan King MD     Allergies:  No Known Allergies    Isolation:  None   Infection:  None   Code Status:  FULL    Ht:  70\" (177.8 cm)   Wt:  199 lb 9.6 oz (90.5 kg)    Admission Cmt:  None   Principal Problem:  None                Active Insurance as of 6/12/2017     Primary Coverage     Payor Plan Insurance Group Employer/Plan Group    ANTHEM BLUE CROSS ANTHEM BLUE CROSS BLUE SHIELD PPO [id number]     Payor Plan Address Payor Plan Phone Number Effective From Effective To    [address] [phone] 11/1/2015     [place] [address]       Subscriber Name Subscriber Birth Date Member ID       MAURICE PUGA 1970 [id number]                 Emergency Contacts      (Rel.) Home Phone Work Phone Mobile Phone    Kristin Puga (Spouse) [phone] -- --            Insurance Information                ANTHEM BLUE CROSS/ANTHEM BLUE CROSS BLUE SHIELD PPO Phone: [phone]    Subscriber: Maurice Puga Subscriber#: [id number]    Group#: [id number] Precert#:              History & Physical      Ranjan King MD at 6/13/2017  2:38 PM          Donna Gold RN   Admission Date: 6/12/2017  12:12 PM 06/13/17      Subjective     Chief Complaint:  Confusion, disorganized thought, depression, suicidal ideation, substance abuse      HPI:  Maurice Puga is a 46 y.o. male who was admitted for complaints of confusion, disorganized thought, " "depression, suicidal ideation. Patient presented to [facility] reporting he's been experiencing suicidal ideation to overdose, thoughts of wanting to be dead for the past 30 days. Reports history of depression for a number of years. During the past few weeks he's experienced worsening symptoms of , low mood, low motivation, irritability, feelings of hopelessness, worthlessness. During the interview,  Patient displays confusion, disorganized thought process, difficulty with concentration.  Patient's affect is blunted, often repeating  what he is questioned, answering inappropriately, illogical responses.  Patient does report difficulty coping with history of substance abuse beginning with marijuana and street drugs in High School.  He admits lately he's been very confused, depression, feeling suicidal. Reports \" I'm just not in control of myself anymore\".  He says he was in the hospital in [place] about 2 weeks ago for detoxification for Benzodiazepine and Opiate.  UDS is positive for marijuana. Patient reports occasional alcohol use. He denies drug use at this time but does admit to  history of  abusing drugs, including using IV route  and alcohol.   He says he was in the hospital in [place] about 2 weeks ago for detoxification for Benzodiazepine and Opiate.         Interview is difficult to complete, reliability of information is questionable related to patient's mental status. Collateral information has been gathered from chart and family.  We were able to contact Patient's Mother who states Patient has long history of drug abuse. She reported one previous inpatient treatment as an Adolescent r/t marijuana use. She says the patient has history of psychiatric treatment as an  Adult on outpatient basis with possible Diagnosis of Bipolar. Apparently she's had little contact with him in recent years and was unable to provide current information. We will continue to attempt to contact the patient's Wife.  Upon the " "Unit patient has admitted to hearing derogatory voices saying \" Look at what you've cause\". He also has  reported paranoid thought, feeling as if people at work have \"been out to get him\", talking about him.  He did initially report to staff he has experienced increased stressors, reporting his relationship strain with Wife and  Son has Leukemia.   He denies any homicidal ideations. He's reports poor sleep and appetite Denies hallucinations at this time. He was admitted to the Adult Psychiatric Unit for safety and further stabilization. Left message for his wife to call us.       Past Psych History: Patient reports history of recent inpatient detoxification treatment in [place], 2 weeks ago. Collateral information gather from Mother who reported at least 1 inpatient hospitalization as an Adolescent rt marijuana use.  Patient's Mother also reported he had outpatient treatment as an Adult with possible diagnosis of Bipolar.      Substance Abuse: UDS is positive for marijuana. Patient denies current use of drugs. Reported occasional alcohol use . Reports he was in an inpatient detoxification program about 2 weeks ago in [place].  He does admit to history of marijuana, Opiate, benzo, methadone.  Use has included IV route. Reportedly, Patient's began misusing  Opiates following back surgery in his 30's.      Family History: Depression in his mother. M,Grandfather alcohol,  Brother, alcohol. No history of mental illness or suicide.     Personal and social history   Patient is  has a 15 year old Daughter, Swathi and a 10 year old Son. He is high school educated, 5 years of college. 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 He states he grew up in [place] with Parents. His Father is now .  Denies legal history. Denies  history . Quaker preference is Restorationist.   Medical/Surgical History:  Past Medical History:   Diagnosis Date   • Anxiety    • Chronic pain disorder     back pain "   • Depression    • Substance abuse      Past Surgical History:   Procedure Laterality Date   • BACK SURGERY         No Known Allergies  Social History   Substance Use Topics   • Smoking status: Current Every Day Smoker     Packs/day: 3.00     Years: 25.00     Types: Cigarettes   • Smokeless tobacco: Current User   • Alcohol use Yes      Comment: occasional     Current Medications:   Current Facility-Administered Medications   Medication Dose Route Frequency Provider Last Rate Last Dose   • aluminum-magnesium hydroxide-simethicone (MAALOX MAX) 400-400-40 MG/5ML suspension 15 mL  15 mL Oral Q6H PRN Sean Ku MD       • benzonatate (TESSALON) capsule 100 mg  100 mg Oral TID PRN Sean Ku MD       • benztropine (COGENTIN) tablet 1 mg  1 mg Oral Daily PRN Sean Ku MD        Or   • benztropine (COGENTIN) injection 0.5 mg  0.5 mg Intramuscular Daily PRN Sean Ku MD       • famotidine (PEPCID) tablet 20 mg  20 mg Oral BID PRN Sean Ku MD   20 mg at 06/13/17 0544   • hydrOXYzine (ATARAX) tablet 50 mg  50 mg Oral Q6H PRN Sean Ku MD   50 mg at 06/13/17 0055   • ibuprofen (ADVIL,MOTRIN) tablet 600 mg  600 mg Oral Q6H PRN Sean Ku MD   600 mg at 06/12/17 2217   • loperamide (IMODIUM) capsule 2 mg  2 mg Oral 4x Daily PRN Sean Ku MD       • magnesium hydroxide (MILK OF MAGNESIA) suspension 2400 mg/10mL 10 mL  10 mL Oral Daily PRN Sean Ku MD       • multivitamin (DAILY VICENTE) tablet 1 tablet  1 tablet Oral Daily Sean Ku MD   1 tablet at 06/13/17 0955   • nicotine (NICODERM CQ) 21 MG/24HR patch 1 patch  1 patch Transdermal Daily Sean Ku MD   1 patch at 06/13/17 0956   • OLANZapine zydis (zyPREXA) disintegrating tablet 5 mg  5 mg Oral BID Ranjan King MD   5 mg at 06/13/17 1100   • OLANZapine zydis (zyPREXA) disintegrating tablet 5 mg  5 mg Oral Q48H PRN Ranjan King,  MD       • ondansetron (ZOFRAN) tablet 4 mg  4 mg Oral Q6H PRN Sean Ku MD       • pneumococcal polysaccharide 23-valent (PNEUMOVAX-23) vaccine 0.5 mL  0.5 mL Intramuscular During Hospitalization Sean Ku MD       • sodium chloride (OCEAN) nasal spray 2 spray  2 spray Each Nare PRN Sean Ku MD       • traZODone (DESYREL) tablet 50 mg  50 mg Oral Nightly PRN Sean Ku MD   50 mg at 06/12/17 9086       Review of Systems    Review of Systems - General ROS: negative for - chills, fever or malaise  Ophthalmic ROS: negative for - loss of vision  ENT ROS: negative for - hearing change  Allergy and Immunology ROS: negative for - hives  Hematological and Lymphatic ROS: negative for - bleeding problems  Endocrine ROS: negative for - skin changes  Respiratory ROS: no cough, shortness of breath, or wheezing  Cardiovascular ROS: no chest pain or dyspnea on exertion  Gastrointestinal ROS: no abdominal pain, change in bowel habits, or black or bloody stools  Genito-Urinary ROS: no dysuria, trouble voiding, or hematuria  Musculoskeletal ROS: negative for - gait disturbance  Neurological ROS: no TIA or stroke symptoms  Dermatological ROS: negative for rash    Objective       General Appearance:    Alert, cooperative, in no acute distress   Head:    Normocephalic, without obvious abnormality, atraumatic   Eyes:            Lids and lashes normal, conjunctivae and sclerae normal, no   icterus, no pallor, corneas clear, PERRLA   Ears:    Ears appear intact with no abnormalities noted   Throat:   No oral lesions, no thrush, oral mucosa moist   Neck:   No adenopathy, supple, trachea midline, no thyromegaly, no   carotid bruit, no JVD   Back:     No kyphosis present, no scoliosis present, no skin lesions,      erythema or scars, no tenderness to percussion or                   palpation,   range of motion normal   Lungs:     Clear to auscultation,respirations regular, even and               "    unlabored    Heart:    Regular rhythm and normal rate, normal S1 and S2, no            murmur, no gallop, no rub, no click   Chest Wall:    No abnormalities observed   Abdomen:     Normal bowel sounds, no masses, no organomegaly, soft        non-tender, non-distended, no guarding, no rebound                tenderness   Rectal:     Deferred   Extremities:   Moves all extremities well, no edema, no cyanosis, no             redness   Pulses:   Pulses palpable and equal bilaterally   Skin:   No bleeding, bruising or rash   Lymph nodes:   No palpable adenopathy   Neurologic:   Cranial nerves 2 - 12 grossly intact, sensation intact, DTR       present and equal bilaterally       Blood pressure 149/86, pulse 51, temperature 98 °F (36.7 °C), temperature source Temporal Artery , resp. rate 16, height 70\" (177.8 cm), weight 199 lb 9.6 oz (90.5 kg), SpO2 98 %.    Mental Status Exam:   Hygiene:   fair  Cooperation:  Cooperative  Eye Contact:  Fair  Psychomotor Behavior:  Appropriate  Affect:  Blunted  Hopelessness: Denies  Speech:  Pressured  Thought Process:  Disorganized and Pressured  Thought Content:  Mood congurent  Suicidal:  None  Homicidal:  None  Hallucinations:  None  Delusion:  None  Memory:  Deficits  Orientation:  Person  Reliability:  poor  Insight:  Poor  Judgement:  Poor  Impulse Control:  Poor  Physical/Medical Issues:  No     Medical Decision Making:              Labs:                  Medications:                multivitamin 1 tablet Oral Daily   nicotine 1 patch Transdermal Daily   OLANZapine zydis 5 mg Oral BID                  •  aluminum-magnesium hydroxide-simethicone  •  benzonatate  •  benztropine **OR** benztropine  •  famotidine  •  hydrOXYzine  •  ibuprofen  •  loperamide  •  magnesium hydroxide  •  OLANZapine zydis  •  ondansetron  •  pneumococcal polysaccharide 23-valent  •  sodium chloride  •  traZODone   All medications reviewed.    Special Precautions: Continue current level of Special " "Precautions.        Assessment/Plan     Patient Active Problem List   Diagnosis Code   • Acute delirium R41.0   • Polysubstance dependence including opioid type drug, continuous use, with delirium F11.221   • Severe episode of recurrent major depressive disorder, with psychotic features F33.3     The patient has been admitted to the [facility] for safety and symptom stabilization. The patient has been given routine orders and placed on special precautions. The patient will be assigned a Master Level Therapist.  A Psychiatrist  will assess the patient daily and work with the treatment team to develop a plan of care.       We discussed risks, benefits, and side effects of the above medication and the patient was agreeable with the plan.             Attestation:  I Donna Gold RN acted as scribe for Dr. MAHAD Phoenix                Physician Attestation: I attest that the above note accurately reflects work and decisions made by me.  KYM PHOENIX MD       Electronically signed by Ranjan Phoenix MD at 6/13/2017  3:29 PM        Hospital Medications (active)       Dose Frequency Start End    aluminum-magnesium hydroxide-simethicone (MAALOX MAX) 400-400-40 MG/5ML suspension 15 mL 15 mL Every 6 Hours PRN 6/12/2017     Sig - Route: Take 15 mL by mouth Every 6 (Six) Hours As Needed for Heartburn. - Oral    benzonatate (TESSALON) capsule 100 mg 100 mg 3 Times Daily PRN 6/12/2017     Sig - Route: Take 1 capsule by mouth 3 (Three) Times a Day As Needed for Cough. - Oral    benztropine (COGENTIN) injection 0.5 mg 0.5 mg Daily PRN 6/12/2017     Sig - Route: Inject 0.5 mL into the shoulder, thigh, or buttocks Daily As Needed (tremors). - Intramuscular    Linked Group 1:  \"Or\" Linked Group Details        benztropine (COGENTIN) tablet 1 mg 1 mg Daily PRN 6/12/2017     Sig - Route: Take 1 tablet by mouth Daily As Needed for Tremors. - Oral    Linked Group 1:  \"Or\" Linked Group Details        chlordiazePOXIDE " (LIBRIUM) capsule 25 mg 25 mg 3 Times Daily 6/14/2017 6/16/2017    Sig - Route: Take 1 capsule by mouth 3 (Three) Times a Day. - Oral    famotidine (PEPCID) tablet 20 mg 20 mg 2 Times Daily PRN 6/12/2017     Sig - Route: Take 1 tablet by mouth 2 (Two) Times a Day As Needed for Heartburn. - Oral    hydrOXYzine (ATARAX) tablet 50 mg 50 mg Every 6 Hours PRN 6/12/2017     Sig - Route: Take 1 tablet by mouth Every 6 (Six) Hours As Needed for Anxiety. - Oral    ibuprofen (ADVIL,MOTRIN) tablet 600 mg 600 mg Every 6 Hours PRN 6/12/2017     Sig - Route: Take 1.5 tablets by mouth Every 6 (Six) Hours As Needed for Mild Pain (1-3) or Moderate Pain (4-6) (severe pain (7-10)). - Oral    loperamide (IMODIUM) capsule 2 mg 2 mg 4 Times Daily PRN 6/12/2017     Sig - Route: Take 1 capsule by mouth 4 (Four) Times a Day As Needed for Diarrhea. - Oral    magnesium hydroxide (MILK OF MAGNESIA) suspension 2400 mg/10mL 10 mL 10 mL Daily PRN 6/12/2017     Sig - Route: Take 10 mL by mouth Daily As Needed for Constipation. - Oral    multivitamin (DAILY VICENTE) tablet 1 tablet 1 tablet Daily 6/14/2017     Sig - Route: Take 1 tablet by mouth Daily. - Oral    nicotine (NICODERM CQ) 21 MG/24HR patch 1 patch 1 patch Daily 6/12/2017     Sig - Route: Place 1 patch on the skin Daily. - Transdermal    OLANZapine zydis (zyPREXA) disintegrating tablet 5 mg 5 mg 2 Times Daily 6/13/2017     Sig - Route: Take 1 tablet by mouth 2 (Two) Times a Day. - Oral    OLANZapine zydis (zyPREXA) disintegrating tablet 5 mg 5 mg Every 48 Hours PRN 6/13/2017     Sig - Route: Take 1 tablet by mouth Every Other Day As Needed (agressive agitation). - Oral    ondansetron (ZOFRAN) tablet 4 mg 4 mg Every 6 Hours PRN 6/12/2017     Sig - Route: Take 1 tablet by mouth Every 6 (Six) Hours As Needed for Nausea or Vomiting. - Oral    pneumococcal polysaccharide 23-valent (PNEUMOVAX-23) vaccine 0.5 mL 0.5 mL During Hospitalization 6/13/2017     Sig - Route: Inject 0.5 mL into the  "shoulder, thigh, or buttocks During Hospitalization for Immunization. - Intramuscular    sodium chloride (OCEAN) nasal spray 2 spray 2 spray As Needed 6/12/2017     Sig - Route: 2 sprays by Each Nare route As Needed for Congestion. - Each Nare    traZODone (DESYREL) tablet 50 mg 50 mg Nightly PRN 6/12/2017     Sig - Route: Take 1 tablet by mouth At Night As Needed for Sleep. - Oral    vitamin B-1 tablet 100 mg 100 mg Daily 6/14/2017     Sig - Route: Take 2 tablets by mouth Daily. - Oral             Physician Progress Notes (last 72 hours) (Notes from 6/12/2017 11:12 AM through 6/15/2017 11:12 AM)      Ranjan King MD at 6/14/2017  8:11 AM  Version 1 of 1               PROGRESS NOTE  Clinician: KYM King MD  Admission Date: 6/12/2017  8:11 AM 06/14/17    Behavioral Health Treatment Plan and Problem List: I have reviewed and approved the Behavioral Health Treatment Plan and Problem list.    Allergies  No Known Allergies    Hospital Day: 2 days      Assessment completed within view of staff    History    CC: \"Feel better\"     Followed for: Acute dementia (withdrawal )/ Depression.     Interval HPI: Patient seen and evaluated by me.  Chart reviewed. Reviewed therapist note, seems BZ much more of an issue than previously thought. Up early this morning, rambling thoughts, says not depression, paranoid delusions and perceptions, oriented to time, place and person \"talking to doctor what your name\", active auditory and visual hallucination ('sun light bunching off my eye lashes\").       Evasive, avoiding mentioning his CD  Issue. His thoughts are disorganized, having trouble finding the next word or thoughts, almost like an expressive aphasia.   We'll continue with both the Zyprexa and lorazepam detox.    Interval Review of Systems: he  General ROS: negative for - fever or malaise  Endocrine ROS: negative for - palpitations  Respiratory ROS: no cough, shortness of breath, or wheezing  Cardiovascular " "ROS: no chest pain or dyspnea on exertion  Gastrointestinal ROS: no abdominal pain,no black or bloody stools    /85 (BP Location: Right arm, Patient Position: Lying)  Pulse 59  Temp 98.1 °F (36.7 °C) (Temporal Artery )   Resp 20  Ht 70\" (177.8 cm)  Wt 199 lb 9.6 oz (90.5 kg)  SpO2 98%  BMI 28.64 kg/m2    Mental Status Exam    Mood/Affect: depressed  Thought Processes:  disorganized, barely coherrent. words without conviction or commitment.  Echos clichés from prior treatment interventions.  Thought Content:  dilussional, paranoid and distorted  Hallucination(s): auditory and visual  Hopelessness: yes  Optimistic:minimally  Suicidal Thoughts:  none  Suicidal Plan/Intent: none  Homicidal Thoughts:  absent      Medical Decision Making:   All recent completed LAB results reviewed and discussed with the patient.        Radiology:     Imaging Results (last 24 hours)     Procedure Component Value Units Date/Time    CT Head Without Contrast [[id number]] Collected:  06/13/17 1023     Updated:  06/13/17 1026    Narrative:       CT HEAD WO CONTRAST-     REASON FOR EXAM: mental status change      The ventricular system and subarachnoid spaces of the brain were  appropriate for age. There is no evidence of mass effect or midline  shift. No acute areas of hemorrhage or infarct are identified. There is  no evidence of subdural or epidural hematoma. No focal brain parenchymal  abnormalities were identified.       Impression:       Negative noncontrasted cranial CT. A source for the  patient's acute mental status change is not identified.           The radiation dose reduction device was utilized for each scan per the  ALARA (as low as reasonably achievable) protocol.     This report was finalized on 6/13/2017 10:24 AM by Dr. Jason Osborn II, MD.               EKG:     ECG/EMG Results (most recent)     Procedure Component Value Units Date/Time    ECG 12 Lead [[id number]] Collected:  06/13/17 1537     Updated:  06/13/17 " 1747    Narrative:       Test Reason : baseline  Blood Pressure : **/** mmHG  Vent. Rate : 053 BPM     Atrial Rate : 053 BPM     P-R Int : 152 ms          QRS Dur : 092 ms      QT Int : 442 ms       P-R-T Axes : 027 045 -12 degrees     QTc Int : 414 ms    Sinus bradycardia  Otherwise normal ECG  No previous ECGs available  Confirmed by Capri Treadwell ([id number]) on 6/13/2017 5:47:21 PM    Referred By:  LIZETT           Confirmed By:Capri Treadwell           Medications:     multivitamin 1 tablet Oral Daily   nicotine 1 patch Transdermal Daily   OLANZapine zydis 5 mg Oral BID       •  aluminum-magnesium hydroxide-simethicone  •  benzonatate  •  benztropine **OR** benztropine  •  famotidine  •  hydrOXYzine  •  ibuprofen  •  loperamide  •  magnesium hydroxide  •  OLANZapine zydis  •  ondansetron  •  pneumococcal polysaccharide 23-valent  •  sodium chloride  •  traZODone   All medications reviewed.    Special Precautions: Continue current level of Special Precautions.    Assessment/Diagnosis: Active Problems:    Acute delirium    Polysubstance dependence including opioid type drug, continuous use, with delirium    Severe episode of recurrent major depressive disorder, with psychotic features      Treatment Plan: Continue current treatment plan.    Attestation:   Physician Attestation: I attest that the above note accurately reflects work and decisions made by me.                 Electronically signed by Ranjan King MD at 6/14/2017  9:57 AM             Nursing Assessments (last 72 hours)      Psych PCS (Body System)       06/12/17 2215 06/13/17 0855 06/13/17 1020 06/13/17 1700 06/13/17 2020    Pain/Comfort/Sleep    Presence Of Pain complains of pain/discomfort complains of pain/discomfort complains of pain/discomfort  denies pain/discomfort    Preferred Pain Scale number (Numeric Rating Pain Scale) number (Numeric Rating Pain Scale) number (Numeric Rating Pain Scale)  number (Numeric Rating Pain Scale)     "Pain Body Location - Orientation   generalized      Pain Body Location back back       Pain Rating (0-10): Rest 10 2 --   \"can' really say\"  0    Pain Rating (0-10): Activity     0    Nonverbal Indicators Of Pain restless  restless      Comfort/Acceptable Pain Level 5        Pain Onset chronic  --   chronic      Pain Management Interventions pain care plan reviewed with patient/caregiver  diversional activity provided      Sleep/Rest/Relaxation feeling unrested;difficulty falling asleep  no problem identified  difficulty falling asleep;unable to stay asleep    Sleep Hygiene Promotion awakenings minimized;regular sleep pattern promoted  regular sleep pattern promoted      Pain/Comfort/Sleep Interventions    Sleep/Rest Enhancement awakenings minimized;consistent schedule promoted  awakenings minimized      Coping/Psychosocial    Verbalized Emotional State acceptance;anxiety;depression;powerlessness;hopelessness;suicidal thoughts;guilt  anxiety;depression  anxiety;depression    Plan Of Care Reviewed With patient  patient  patient    Patient Agreement with Plan of Care agrees  agrees  agrees    Supportive Measures active listening utilized;verbalization of feelings encouraged  active listening utilized  active listening utilized;verbalization of feelings encouraged    Trust Relationship/Rapport care explained;choices provided;emotional support provided;empathic listening provided;questions answered;questions encouraged;reassurance provided;thoughts/feelings acknowledged  questions answered;questions encouraged;reassurance provided;thoughts/feelings acknowledged  thoughts/feelings acknowledged    Coping/Psychosocial Interventions    Behavior Management behavioral plan developed;behavioral plan reviewed  behavioral plan reviewed      Environment Familiarity/Consistency daily routine followed  daily routine followed      Environmental Support calm environment promoted;environmental consistency promoted  calm environment " "promoted      HEENT    HEENT WDL WDL  WDL      Behavioral    General Appearance WDL  WDL except;appearance  WDL  WDL    General Appearance other (see comments)   disheveled  dress appropriate for weather/appropriate for setting      Behavior WDL    Behavior WDL  WDL except;all  WDL   WDL except;motor movement    Interactions eye contact, hesitant to make;guarded;cooperative;other (see comments)   sentences without substance- illogical   cooperative      Motor Movement apprehensive;restless;tense  apprehensive  apprehensive    Emotion Mood WDL    Emotion/Mood/Affect WDL WDL except;all  WDL except  WDL except;all    Affect flat  flat  flat    Emotion/Mood/Affect anxious;depressed;somber;tense  anxious;depressed  anxious;depressed    Patient rated anxiety level 10  10  8    Patient rated depression level 10  10  8    Speech WDL    Speech WDL  WDL except;speech  WDL  WDL    Speech hyperverbal;response lag        Perceptual State WDL    Perceptual State WDL  WDL except;all   WDL except   \"continuous voices and I try not to listen. See's smilely    WDL except;hallucinations    Hallucinations auditory   hearing voices talking to him  auditory;visual   faces and good and evil battles\"  auditory   \"I hear lots of voices.\"    Perceptual State illusions        Thought Process WDL    Thought Process WDL  WDL except;thought process   WDL except   WDL except;thought process    Delusions paranoid   thoughts that people are talking about him  paranoid   \"people  me and I  them\"  paranoid    Judgment and Insight judgment not appropriate to situation;insight not appropriate to situation   feeling judged  insight not appropriate to situation;judgment not appropriate to situation  insight not appropriate to situation;judgment not appropriate to situation    Thought Content helplessness;hopelessness;worthlessness;suicidal thoughts  somatic concerns  somatic concerns    Thought Process disorganized;circumstantial " thought;illogical  disorganized  disorganized    Intellectual Performance WDL    Intellectual Performance WDL  WDL except;all   WDL except   WDL except;intellectual performance    Intellectual Performance forgetfulness;impaired concentration;mind wandering;repetition of questions;memory deficit, immediate;memory deficit, remote;disoriented to time  mind wandering  impaired concentration;mind wandering;disoriented to time    Level of Consciousness Alert  Alert      Cognitive    Cognitive/Neuro/Behavioral WDL  WDL except;all   WDL except   WDL except;mood/behavior;orientation    Arousal Level opens eyes spontaneously  opens eyes spontaneously      Orientation disoriented to;time   uncertain of day and date  disoriented to;time  disoriented to;time    Speech illogical  clear      Mood/Behavior anxious;withdrawn;cooperative  anxious;sad  anxious;flat affect    Patient rated craving level 0   denies w/d sx        Cognitive Interventions    Communication Enhancement Strategies extra time allowed for response;one step directions provided;repetition utilized  extra time allowed for response      Orientation Measures clock in view  clock in view      Sensory Stimulation Regulation quiet environment promoted  quiet environment promoted      Neuro    Additional Documentation [place] Coma Scale (Group)  [place] Coma Scale (Group)  [place] Coma Scale (Group)    [place] Coma Scale    Best Eye Response 4-->(E4) spontaneous  4-->(E4) spontaneous  4-->(E4) spontaneous    Best Motor Response 6-->(M6) obeys commands  6-->(M6) obeys commands  6-->(M6) obeys commands    Best Verbal Response 3-->(V3) inappropriate words   uncertain of day/date- inappropriate use of words in sentanc  3-->(V3) inappropriate words  4-->(V4) confused    Pastor Coma Scale Score 13  13  14    Assessment Qualifiers --   sentences do not answer questions        Respiratory    Respiratory WDL WDL        Cardiac    Cardiac WDL WDL        Peripheral Neurovascular     Peripheral Neurovascular WDL WDL        Gastrointestinal    GI WDL WDL        Genitourinary    Genitourinary WDL WDL        Skin    Skin WDL WDL  WDL  WDL    Cameron Risk Assessment (If Cameron score </= 18, add the appropriate CPG to the care plan)    Sensory Perception   4-->no impairment  4-->no impairment    Moisture   4-->rarely moist  4-->rarely moist    Activity   4-->walks frequently  4-->walks frequently    Mobility   4-->no limitation  4-->no limitation    Nutrition   3-->adequate  3-->adequate    Friction and Shear   3-->no apparent problem  3-->no apparent problem    Cameron Score   22  22    Musculoskeletal    Musculoskeletal WDL WDL  WDL  WDL    Functional Screen Current    Ambulation   0-->independent      Transferring   0-->independent      Toileting   0-->independent      Bathing   0-->independent      Dressing   0-->independent      Eating   0-->independent      Communication   2-->difficulty understanding (not related to language barrier)      Swallowing (if score 2 or more for any item, consult Rehab Services)   0-->swallows foods/liquids without difficulty      Musculoskeletal Interventions    Self-Care Promotion independence encouraged  independence encouraged      Nutrition    Diet/Nutrition Prescription regular  regular      Diet/Feeding Assistance none        Diet/Feeding Tolerance poor  fair  fair    Intake (%)    100%     Safety    Safety ID band on  ID band on  ID band on   pt checked    Patient Location   staff office, specify   with nurse      Observed Behavior sitting  calm      Safety Round Comment sp3  sp3  SP3    Safety Measures safety plan mutually developed;safety plan reviewed  safety plan reviewed  environmental rounds completed;safety rounds completed    Suicide Risk    Suicidal Ideation yes  no  no    Suicide Plan/Availability thoughts of overdosing , uncertain- states that he has had thought        Current Suicidal Precipitating Factors disorganized thoughts- recent detox,  unable to think clearly, relationship stressor        Previous Suicide Attempt (describe) denies        Willingness to Contact Staff Member if Feeling Like Hurting Self yes  yes  yes    Homicide Risk    Homicidal Ideation   no  no    Willingness to Contact Staff Member if Feeling Like Hurting Others   yes  yes    Leonard Veliz     Mental Status   13-->Intermittently confused  13-->Intermittently confused    Elimination   8-->Independent with control of bowel/bladder  8-->Independent with control of bowel/bladder    Medications   10-->No medications  8-->Psychotropic medications (including benzodiazepines and antidepressants)    Diagnosis   8-->Substance abuse/alcohol abuse  10-->Major depressive disorder;8-->Substance abuse/alcohol abuse    Ambulation/Balance   7-->Independent/steady gait/immobile  7-->Independent/steady gait/immobile    Nutrition   12-->Has had very little food or fluids in the past 24 hours  12-->Has had very little food or fluids in the past 24 hours    Sleep Disturbance   12-->Report of sleep disturbance by patient, family or staff  12-->Report of sleep disturbance by patient, family or staff    History of Falls   8-->No history of falls  8-->No history of falls    Leonard Total   86  94    Safety Interventions    Safety Management patient interviewed alone;personal safety plan developed  patient interviewed alone  patient interviewed alone    Safety Promotion/Fall Prevention safety round/check completed;nonskid shoes/slippers when out of bed;supervised activity  safety round/check completed  safety round/check completed;nonskid shoes/slippers when out of bed    Environmental Safety Modification clutter free environment maintained  clutter free environment maintained  assistive device/personal items within reach;clutter free environment maintained    Daily Care    Activity up ad enrique  up ad enrique  up ad enrique      06/13/17 2109 06/13/17 2209 06/14/17 0838 06/14/17 0840 06/14/17 2025     Pain/Comfort/Sleep    Presence Of Pain complains of pain/discomfort non-verbal indicator of pain/discomfort not present denies pain/discomfort denies pain/discomfort     Preferred Pain Scale number (Numeric Rating Pain Scale)  number (Numeric Rating Pain Scale) number (Numeric Rating Pain Scale)     Pain Body Location - Side Bilateral        Pain Body Location - Orientation lower        Pain Body Location back        Pain Rating (0-10): Rest   0 0     Pain Rating (0-10): Activity 10  0      Pain Management Interventions medicated        Sleep/Rest/Relaxation  appears asleep  no problem identified     Sleep Hygiene Promotion awakenings minimized;regular sleep pattern promoted awakenings minimized  regular sleep pattern promoted     Pain/Comfort/Sleep Interventions    Sleep/Rest Enhancement  awakenings minimized  awakenings minimized     Coping/Psychosocial    Verbalized Emotional State    anxiety;depression acceptance    Plan Of Care Reviewed With    patient patient    Patient Agreement with Plan of Care    agrees agrees    Transition Support    follow up care discussed follow up care discussed    Supportive Measures    active listening utilized active listening utilized    Trust Relationship/Rapport    questions answered;questions encouraged;reassurance provided;thoughts/feelings acknowledged care explained;reassurance provided;thoughts/feelings acknowledged;questions answered    Family/Support System    Involvement in Therapy     --   Wife/Kristin    Family/Support System Comment     Pt. called his wife and she then talked to this RN. Supportive to pt.     Coping/Psychosocial Interventions    Behavior Management    behavioral plan reviewed     Environment Familiarity/Consistency    daily routine followed     Environmental Support    calm environment promoted     HEENT    HEENT WDL    WDL     Behavioral    General Appearance WDL    WDL WDL    General Appearance    dress appropriate for weather/appropriate for  "setting dress appropriate for weather/appropriate for setting    Behavior WDL    Behavior WDL    WDL  WDL except    Interactions    cooperative cooperative;eye contact, intense    Motor Movement    apprehensive apprehensive    Emotion Mood WDL    Emotion/Mood/Affect WDL    WDL except WDL except    Affect    flat flat    Emotion/Mood/Affect     anxious;depressed;tense    Patient rated anxiety level    0 10    Patient rated depression level    0 10    Speech WDL    Speech WDL    WDL WDL    Speech    response lag;soft/quiet moderate rate and volume;rambling    Perceptual State WDL    Perceptual State WDL     WDL except;hallucinations  WDL except    Hallucinations    visual   sees things floating around like science visual   \"I see good things, have you ever done that?\"    Perceptual State    illusions illusions    Thought Process WDL    Thought Process WDL     WDL except;thought process  WDL except    Judgment and Insight    insight not appropriate to situation;judgment not appropriate to situation insight not appropriate to situation;judgment not appropriate to situation    Thought Content    denial denial;other (see comments)   Pt. is intermittentlyu confused but sometimes goal oriented.    Thought Process    disorganized flight of ideas;relevant   Sometimes relevant then lapses into flight of ideas.     Intellectual Performance WDL    Intellectual Performance WDL     WDL except  WDL except    Intellectual Performance    impaired concentration impaired concentration    Level of Consciousness    Alert Alert    Cognitive    Cognitive/Neuro/Behavioral WDL     WDL except WDL    Arousal Level    opens eyes spontaneously     Orientation    oriented x 4 oriented x 4    Speech    garbled     Mood/Behavior     anxious;restless    Additional Documentation     Cravings;Current withdrawal symptoms    Patient rated craving level     0    Current withdrawal symptoms     Other (comment)   Denies    Cognitive Interventions    " Communication Enhancement Strategies    extra time allowed for response extra time allowed for response;repetition utilized    Orientation Measures    clock in view clock in view    Sensory Stimulation Regulation    quiet environment promoted care clustered;music/television provided for relaxation;quiet environment promoted    Neuro    Additional Documentation    [place] Coma Scale (Group) Pastor Coma Scale (Group)    [place] Coma Scale    Best Eye Response    4-->(E4) spontaneous 4-->(E4) spontaneous    Best Motor Response    6-->(M6) obeys commands 6-->(M6) obeys commands    Best Verbal Response    5-->(V5) oriented 4-->(V4) confused    [place] Coma Scale Score    15 14    Skin    Skin WDL    WDL WDL    Cameron Risk Assessment (If Cameron score </= 18, add the appropriate CPG to the care plan)    Sensory Perception    4-->no impairment 4-->no impairment    Moisture    4-->rarely moist 4-->rarely moist    Activity    4-->walks frequently 4-->walks frequently    Mobility    4-->no limitation 4-->no limitation    Nutrition    3-->adequate 3-->adequate    Friction and Shear    3-->no apparent problem 3-->no apparent problem    Cameron Score    22 22    Musculoskeletal    Musculoskeletal WDL    WDL WDL    Functional Screen Current    Ambulation    0-->independent 0-->independent    Transferring    0-->independent 0-->independent    Toileting    0-->independent 0-->independent    Bathing    0-->independent 0-->independent    Dressing    0-->independent 0-->independent    Eating    0-->independent 0-->independent    Communication    0-->understands/communicates without difficulty 0-->understands/communicates without difficulty    Swallowing (if score 2 or more for any item, consult Rehab Services)    0-->swallows foods/liquids without difficulty 0-->swallows foods/liquids without difficulty    Musculoskeletal Interventions    Self-Care Promotion    independence encouraged independence encouraged    Nutrition     "Diet/Nutrition Prescription    regular regular    Diet/Feeding Tolerance    fair     Safety    Safety    ID band on ID band on;bed in low position    Patient Location    day room     Observed Behavior    calm;sitting pacing    Safety Round Comment    sp3     Safety Measures    safety rounds completed safety rounds completed    Suicide Risk    Suicidal Ideation    no yes    Suicide Plan/Availability     --   \"Sometimes I want to die.\" No plan.     Willingness to Contact Staff Member if Feeling Like Hurting Self    yes yes    Homicide Risk    Homicidal Ideation    no no    Willingness to Contact Staff Member if Feeling Like Hurting Others    yes yes    [place]ard Falls     Mental Status    13-->Intermittently confused 13-->Intermittently confused    Elimination    8-->Independent with control of bowel/bladder 8-->Independent with control of bowel/bladder    Medications    8-->Psychotropic medications (including benzodiazepines and antidepressants) 8-->Psychotropic medications (including benzodiazepines and antidepressants)    Diagnosis    10-->Major depressive disorder 10-->Major depressive disorder    Ambulation/Balance    7-->Independent/steady gait/immobile 7-->Independent/steady gait/immobile    Nutrition    12-->Has had very little food or fluids in the past 24 hours 12-->Has had very little food or fluids in the past 24 hours    Sleep Disturbance    12-->Report of sleep disturbance by patient, family or staff 12-->Report of sleep disturbance by patient, family or staff    History of Falls    8-->No history of falls 8-->No history of falls    Leonard Total    86 86    Safety Interventions    Safety Management    patient interviewed alone patient interviewed alone    Safety Promotion/Fall Prevention    safety round/check completed safety round/check completed;nonskid shoes/slippers when out of bed    Environmental Safety Modification    clutter free environment maintained clutter free environment maintained    " Daily Care    Activity    up ad enrique up ad enrique      06/14/17 2110 06/15/17 0705 06/15/17 0800 06/15/17 0805       Pain/Comfort/Sleep    Presence Of Pain denies pain/discomfort complains of pain/discomfort       Preferred Pain Scale number (Numeric Rating Pain Scale) number (Numeric Rating Pain Scale)       Pain Body Location - Orientation  generalized       Pain Body Location  back       Pain Frequency  constant       Pain Quality  aching       Pain Rating (0-10): Rest 0        Pain Management Interventions  medicated   pt requesting PRN Motrin- see MAR       Sleep/Rest/Relaxation difficulty falling asleep   pt requesting prn sleep aid   unable to go back to sleep;nightmares   Reports nightmares about his loved ones.     Sleep Hygiene Promotion    awakenings minimized;noise level reduced;regular sleep pattern promoted;relaxation techniques promoted     Fever Reduction/Comfort Measures medication administered   see 15 min check for sleep follow up        Pain/Comfort/Sleep Interventions    Sleep/Rest Enhancement    consistent schedule promoted;regular sleep/rest pattern promoted;noise level reduced;relaxation techniques promoted     Coping/Psychosocial    Verbalized Emotional State    anxiety;guilt     Plan Of Care Reviewed With    patient     Patient Agreement with Plan of Care    agrees     Transition Support    follow up care discussed     Complementary Therapy    music therapy     Supportive Measures    active listening utilized;self-responsibility promoted;self-reflection promoted;self-care encouraged;goal setting facilitated;problem solving facilitated     Family/Support System Care    self-care encouraged     Trust Relationship/Rapport    care explained;choices provided;emotional support provided;empathic listening provided;reassurance provided;thoughts/feelings acknowledged     Additional Documentation    Group Therapy Session (Group)     Coping/Psychosocial Interventions    Behavior Management    behavioral  "plan reviewed     Environment Familiarity/Consistency    daily routine followed;personal clothing/items utilized     Environmental Support    calm environment promoted;rest periods encouraged;personal routine supported     HEENT    HEENT WDL    WDL     Behavioral    General Appearance WDL    WDL     General Appearance    dress appropriate for weather/appropriate for setting     Behavior WDL    Behavior WDL     WDL except;interactions     Interactions    eye contact, intermittent;guarded;cooperative     Motor Movement    apprehensive     Emotion Mood WDL    Emotion/Mood/Affect WDL    WDL except;all     Affect    blunted     Emotion/Mood/Affect    anxious;sad     Patient rated anxiety level    10     Patient rated depression level    0     Speech WDL    Speech WDL     WDL except;speech     Speech    moderate rate and volume;response lag;paucity     Perceptual State WDL    Perceptual State WDL     WDL except;hallucinations   \"I think sometimes in my dreams.\"     Hallucinations    auditory;visual   \"I am better now, I see things in my dreams\"     Perceptual State    illusions     Thought Process WDL    Thought Process WDL     WDL except;thought process     Delusions    paranoid     Judgment and Insight    insight not appropriate to situation     Thought Content    helplessness;worthlessness     Thought Process    disorganized     Intellectual Performance WDL    Intellectual Performance WDL     WDL except     Intellectual Performance    impaired concentration     Level of Consciousness    Alert     Cognitive    Cognitive/Neuro/Behavioral WDL     WDL except;mood/behavior     Arousal Level    opens eyes spontaneously     Orientation    oriented x 4     Speech    clear;pace/rate variance     Mood/Behavior    anxious;cooperative     Additional Documentation    Cravings;Current withdrawal symptoms     Patient rated craving level    10     Current withdrawal symptoms    Anxiety;Craving;Body aches     CIWA-Ar    Nausea and " Vomiting    0-->no nausea and no vomiting     Tremor    0-->no tremor     Paroxysmal Sweats    0-->no sweat visible     Anxiety    2     Agitation    0-->normal activity     Tactile Disturbances    0-->none     Auditory Disturbances    0-->not present     Visual Disturbances    0-->not present     Headache, Fullness in Head    0-->not present     Orientation and Clouding of Sensorium    0-->oriented and can do serial additions     Total CIWA-Ar Score    2     COWS Assessment    Resting Pulse Rate    Pulse rate 80 or below     Sweating    No report of chills or flushing     Restlessness    Reports difficulty sitting still, but is able to do so)     Pupil Size    Pupils pinned or normal size for room light     Bone or Joint Aches    Mild/diffuse discomfort     Runny Nose or Tearing    None present     GI Upset    No GI symptoms     Tremor    No tremor     Yawning    No yawning     Anxiety or Irritability    Patient reports increasing irritability or anxiousness     Gooseflesh Skin    None     COWS Total    3 - Mild     Cognitive Interventions    Communication Enhancement Strategies    extra time allowed for response;one step directions provided;repetition utilized     Orientation Measures    clock in view;reorientation provided     Sensory Stimulation Regulation    care clustered;music/television provided for relaxation     Neuro    Additional Documentation    Pastor Coma Scale (Group)     Pastor Coma Scale    Best Eye Response    4-->(E4) spontaneous     Best Motor Response    6-->(M6) obeys commands     Best Verbal Response    4-->(V4) confused     [place] Coma Scale Score    14     Neurological Interventions    Hearing/Communication Enhancement    background noise decreased     Respiratory    Respiratory WDL    WDL     Oxygen Therapy    O2 Device    room air     Cardiac    Cardiac WDL    WDL     Peripheral Neurovascular    Peripheral Neurovascular WDL    WDL     Gastrointestinal    GI WDL    WDL     Last Bowel  Movement    06/14/17     Genitourinary    Genitourinary WDL    WDL     Skin    Skin WDL    WDL     Cameron Risk Assessment (If Cameron score </= 18, add the appropriate CPG to the care plan)    Sensory Perception    4-->no impairment     Moisture    4-->rarely moist     Activity    4-->walks frequently     Mobility    4-->no limitation     Nutrition    3-->adequate     Friction and Shear    3-->no apparent problem     Cameron Score    22     Musculoskeletal    Musculoskeletal WDL    WDL     Functional Screen Current    Ambulation    0-->independent     Transferring    0-->independent     Toileting    0-->independent     Bathing    0-->independent     Dressing    0-->independent     Eating    0-->independent     Communication    0-->understands/communicates without difficulty     Swallowing (if score 2 or more for any item, consult Rehab Services)    0-->swallows foods/liquids without difficulty     Musculoskeletal Interventions    Self-Care Promotion    independence encouraged     Nutrition    Diet/Nutrition Prescription    regular     Diet/Feeding Assistance    none     Diet/Feeding Tolerance    good     Fluids    adequate     Nutrition Risk Screen    no indicators present     Safety    Safety   wooden bed;bed in low position;ID band on   Patient safety check complete wooden bed;ID band on   Patient safety check complete     Patient Location    patient's room     Observed Behavior    calm;sitting     Suicide Risk    Suicidal Ideation    no     Suicide Plan/Availability    Denies at present     Current Suicidal Precipitating Factors    disorganized thoughts     Previous Suicide Attempt (describe)    Denies     Willingness to Contact Staff Member if Feeling Like Hurting Self    yes     Homicide Risk    Homicidal Ideation    no     Willingness to Contact Staff Member if Feeling Like Hurting Others    yes     [place]     Mental Status    13-->Intermittently confused     Elimination    8-->Independent with control  of bowel/bladder     Medications    8-->Psychotropic medications (including benzodiazepines and antidepressants)     Diagnosis    10-->Major depressive disorder     Ambulation/Balance    7-->Independent/steady gait/immobile     Nutrition    0-->No apparent abnormalities with appetite     Sleep Disturbance    12-->Report of sleep disturbance by patient, family or staff     History of Falls    8-->No history of falls     Leonard Total    74     Safety Interventions    Safety Management    patient interviewed alone     Safety Promotion/Fall Prevention    safety round/check completed;nonskid shoes/slippers when out of bed;supervised activity     Environmental Safety Modification    clutter free environment maintained;room organization consistent;room near unit station     Daily Care    Activity    activity adjusted per tolerance;up ad enrique

## 2017-06-15 NOTE — DISCHARGE PLACEMENT REQUEST
"Maurice Puga (46 y.o. Male)     Date of Birth Social Security Number Address Home Phone MRN    1970  [address] [place] [address] [phone] [id number]    Gnosticist Marital Status          None        Admission Date Admission Type Admitting Provider Attending Provider Department, Room/Bed    6/12/17 Emergency Sean Ku MD [place], Ranjan Gomez MD [facility] ADULT PSYCHIATRIC 2, 1043/1S    Discharge Date Discharge Disposition Discharge Destination                      Attending Provider: Ranjan King MD     Allergies:  No Known Allergies    Isolation:  None   Infection:  None   Code Status:  FULL    Ht:  70\" (177.8 cm)   Wt:  199 lb 9.6 oz (90.5 kg)    Admission Cmt:  None   Principal Problem:  None                Active Insurance as of 6/12/2017     Primary Coverage     Payor Plan Insurance Group Employer/Plan Group    ANTHEM BLUE CROSS ANTHEM BLUE CROSS BLUE SHIELD PPO [id number]     Payor Plan Address Payor Plan Phone Number Effective From Effective To    [address] [phone] 11/1/2015     [place] [address]       Subscriber Name Subscriber Birth Date Member ID       MAURICE PUGA 1970 [id number]                 Emergency Contacts      (Rel.) Home Phone Work Phone Mobile Phone    Kristin Puga (Spouse) [phone] -- --            Insurance Information                ANTHEM BLUE CROSS/ANTHEM BLUE CROSS BLUE SHIELD PPO Phone: [phone]    Subscriber: Maurice Puga Subscriber#: [id number]    Group#: [id number] Precert#:              History & Physical      Ranjan King MD at 6/13/2017  2:38 PM          Donna Gold RN   Admission Date: 6/12/2017  12:12 PM 06/13/17      Subjective     Chief Complaint:  Confusion, disorganized thought, depression, suicidal ideation, substance abuse      HPI:  Maurice Puga is a 46 y.o. male who was admitted for complaints of confusion, disorganized thought, " "depression, suicidal ideation. Patient presented to [facility] reporting he's been experiencing suicidal ideation to overdose, thoughts of wanting to be dead for the past 30 days. Reports history of depression for a number of years. During the past few weeks he's experienced worsening symptoms of , low mood, low motivation, irritability, feelings of hopelessness, worthlessness. During the interview,  Patient displays confusion, disorganized thought process, difficulty with concentration.  Patient's affect is blunted, often repeating  what he is questioned, answering inappropriately, illogical responses.  Patient does report difficulty coping with history of substance abuse beginning with marijuana and street drugs in High School.  He admits lately he's been very confused, depression, feeling suicidal. Reports \" I'm just not in control of myself anymore\".  He says he was in the hospital in [place] about 2 weeks ago for detoxification for Benzodiazepine and Opiate.  UDS is positive for marijuana. Patient reports occasional alcohol use. He denies drug use at this time but does admit to  history of  abusing drugs, including using IV route  and alcohol.   He says he was in the hospital in [place] about 2 weeks ago for detoxification for Benzodiazepine and Opiate.         Interview is difficult to complete, reliability of information is questionable related to patient's mental status. Collateral information has been gathered from chart and family.  We were able to contact Patient's Mother who states Patient has long history of drug abuse. She reported one previous inpatient treatment as an Adolescent r/t marijuana use. She says the patient has history of psychiatric treatment as an  Adult on outpatient basis with possible Diagnosis of Bipolar. Apparently she's had little contact with him in recent years and was unable to provide current information. We will continue to attempt to contact the patient's Wife.  Upon the " "Unit patient has admitted to hearing derogatory voices saying \" Look at what you've cause\". He also has  reported paranoid thought, feeling as if people at work have \"been out to get him\", talking about him.  He did initially report to staff he has experienced increased stressors, reporting his relationship strain with Wife and  Son has Leukemia.   He denies any homicidal ideations. He's reports poor sleep and appetite Denies hallucinations at this time. He was admitted to the Adult Psychiatric Unit for safety and further stabilization. Left message for his wife to call us.       Past Psych History: Patient reports history of recent inpatient detoxification treatment in [place], 2 weeks ago. Collateral information gather from Mother who reported at least 1 inpatient hospitalization as an Adolescent rt marijuana use.  Patient's Mother also reported he had outpatient treatment as an Adult with possible diagnosis of Bipolar.      Substance Abuse: UDS is positive for marijuana. Patient denies current use of drugs. Reported occasional alcohol use . Reports he was in an inpatient detoxification program about 2 weeks ago in [place].  He does admit to history of marijuana, Opiate, benzo, methadone.  Use has included IV route. Reportedly, Patient's began misusing  Opiates following back surgery in his 30's.      Family History: Depression in his mother. M,Grandfather alcohol,  Brother, alcohol. No history of mental illness or suicide.     Personal and social history   Patient is  has a 15 year old Daughter, Swathi and a 10 year old Son. He is high school educated, 5 years of college. 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 He states he grew up in [place] with Parents. His Father is now .  Denies legal history. Denies  history . Church preference is Roman Catholic.   Medical/Surgical History:  Past Medical History:   Diagnosis Date   • Anxiety    • Chronic pain disorder     back pain "   • Depression    • Substance abuse      Past Surgical History:   Procedure Laterality Date   • BACK SURGERY         No Known Allergies  Social History   Substance Use Topics   • Smoking status: Current Every Day Smoker     Packs/day: 3.00     Years: 25.00     Types: Cigarettes   • Smokeless tobacco: Current User   • Alcohol use Yes      Comment: occasional     Current Medications:   Current Facility-Administered Medications   Medication Dose Route Frequency Provider Last Rate Last Dose   • aluminum-magnesium hydroxide-simethicone (MAALOX MAX) 400-400-40 MG/5ML suspension 15 mL  15 mL Oral Q6H PRN Sean Ku MD       • benzonatate (TESSALON) capsule 100 mg  100 mg Oral TID PRN Sean Ku MD       • benztropine (COGENTIN) tablet 1 mg  1 mg Oral Daily PRN Sean Ku MD        Or   • benztropine (COGENTIN) injection 0.5 mg  0.5 mg Intramuscular Daily PRN Sean Ku MD       • famotidine (PEPCID) tablet 20 mg  20 mg Oral BID PRN Sean Ku MD   20 mg at 06/13/17 0544   • hydrOXYzine (ATARAX) tablet 50 mg  50 mg Oral Q6H PRN Sean Ku MD   50 mg at 06/13/17 0055   • ibuprofen (ADVIL,MOTRIN) tablet 600 mg  600 mg Oral Q6H PRN Sean Ku MD   600 mg at 06/12/17 2217   • loperamide (IMODIUM) capsule 2 mg  2 mg Oral 4x Daily PRN Sean Ku MD       • magnesium hydroxide (MILK OF MAGNESIA) suspension 2400 mg/10mL 10 mL  10 mL Oral Daily PRN Sean Ku MD       • multivitamin (DAILY VICENTE) tablet 1 tablet  1 tablet Oral Daily Sean Ku MD   1 tablet at 06/13/17 0955   • nicotine (NICODERM CQ) 21 MG/24HR patch 1 patch  1 patch Transdermal Daily Sean Ku MD   1 patch at 06/13/17 0956   • OLANZapine zydis (zyPREXA) disintegrating tablet 5 mg  5 mg Oral BID Ranjan King MD   5 mg at 06/13/17 1100   • OLANZapine zydis (zyPREXA) disintegrating tablet 5 mg  5 mg Oral Q48H PRN Ranjan King,  MD       • ondansetron (ZOFRAN) tablet 4 mg  4 mg Oral Q6H PRN Sean Ku MD       • pneumococcal polysaccharide 23-valent (PNEUMOVAX-23) vaccine 0.5 mL  0.5 mL Intramuscular During Hospitalization Sean Ku MD       • sodium chloride (OCEAN) nasal spray 2 spray  2 spray Each Nare PRN Sean Ku MD       • traZODone (DESYREL) tablet 50 mg  50 mg Oral Nightly PRN Sean Ku MD   50 mg at 06/12/17 1722       Review of Systems    Review of Systems - General ROS: negative for - chills, fever or malaise  Ophthalmic ROS: negative for - loss of vision  ENT ROS: negative for - hearing change  Allergy and Immunology ROS: negative for - hives  Hematological and Lymphatic ROS: negative for - bleeding problems  Endocrine ROS: negative for - skin changes  Respiratory ROS: no cough, shortness of breath, or wheezing  Cardiovascular ROS: no chest pain or dyspnea on exertion  Gastrointestinal ROS: no abdominal pain, change in bowel habits, or black or bloody stools  Genito-Urinary ROS: no dysuria, trouble voiding, or hematuria  Musculoskeletal ROS: negative for - gait disturbance  Neurological ROS: no TIA or stroke symptoms  Dermatological ROS: negative for rash    Objective       General Appearance:    Alert, cooperative, in no acute distress   Head:    Normocephalic, without obvious abnormality, atraumatic   Eyes:            Lids and lashes normal, conjunctivae and sclerae normal, no   icterus, no pallor, corneas clear, PERRLA   Ears:    Ears appear intact with no abnormalities noted   Throat:   No oral lesions, no thrush, oral mucosa moist   Neck:   No adenopathy, supple, trachea midline, no thyromegaly, no   carotid bruit, no JVD   Back:     No kyphosis present, no scoliosis present, no skin lesions,      erythema or scars, no tenderness to percussion or                   palpation,   range of motion normal   Lungs:     Clear to auscultation,respirations regular, even and               "    unlabored    Heart:    Regular rhythm and normal rate, normal S1 and S2, no            murmur, no gallop, no rub, no click   Chest Wall:    No abnormalities observed   Abdomen:     Normal bowel sounds, no masses, no organomegaly, soft        non-tender, non-distended, no guarding, no rebound                tenderness   Rectal:     Deferred   Extremities:   Moves all extremities well, no edema, no cyanosis, no             redness   Pulses:   Pulses palpable and equal bilaterally   Skin:   No bleeding, bruising or rash   Lymph nodes:   No palpable adenopathy   Neurologic:   Cranial nerves 2 - 12 grossly intact, sensation intact, DTR       present and equal bilaterally       Blood pressure 149/86, pulse 51, temperature 98 °F (36.7 °C), temperature source Temporal Artery , resp. rate 16, height 70\" (177.8 cm), weight 199 lb 9.6 oz (90.5 kg), SpO2 98 %.    Mental Status Exam:   Hygiene:   fair  Cooperation:  Cooperative  Eye Contact:  Fair  Psychomotor Behavior:  Appropriate  Affect:  Blunted  Hopelessness: Denies  Speech:  Pressured  Thought Process:  Disorganized and Pressured  Thought Content:  Mood congurent  Suicidal:  None  Homicidal:  None  Hallucinations:  None  Delusion:  None  Memory:  Deficits  Orientation:  Person  Reliability:  poor  Insight:  Poor  Judgement:  Poor  Impulse Control:  Poor  Physical/Medical Issues:  No     Medical Decision Making:              Labs:                  Medications:                multivitamin 1 tablet Oral Daily   nicotine 1 patch Transdermal Daily   OLANZapine zydis 5 mg Oral BID                  •  aluminum-magnesium hydroxide-simethicone  •  benzonatate  •  benztropine **OR** benztropine  •  famotidine  •  hydrOXYzine  •  ibuprofen  •  loperamide  •  magnesium hydroxide  •  OLANZapine zydis  •  ondansetron  •  pneumococcal polysaccharide 23-valent  •  sodium chloride  •  traZODone   All medications reviewed.    Special Precautions: Continue current level of Special " "Precautions.        Assessment/Plan     Patient Active Problem List   Diagnosis Code   • Acute delirium R41.0   • Polysubstance dependence including opioid type drug, continuous use, with delirium F11.221   • Severe episode of recurrent major depressive disorder, with psychotic features F33.3     The patient has been admitted to the [facility] for safety and symptom stabilization. The patient has been given routine orders and placed on special precautions. The patient will be assigned a Master Level Therapist.  A Psychiatrist  will assess the patient daily and work with the treatment team to develop a plan of care.       We discussed risks, benefits, and side effects of the above medication and the patient was agreeable with the plan.             Attestation:  I Donna Gold, LUKAS acted as scribe for Dr. MAHAD Phoenix                Physician Attestation: I attest that the above note accurately reflects work and decisions made by me.  KYM PHOENIX MD       Electronically signed by Ranjan Phoenix MD at 6/13/2017  3:29 PM           Physician Progress Notes (last 72 hours) (Notes from 6/12/2017 11:52 AM through 6/15/2017 11:52 AM)      Ranjan Phoenix MD at 6/14/2017  8:11 AM  Version 1 of 1               PROGRESS NOTE  Clinician: KYM Phoenix MD  Admission Date: 6/12/2017  8:11 AM 06/14/17    Behavioral Health Treatment Plan and Problem List: I have reviewed and approved the Behavioral Health Treatment Plan and Problem list.    Allergies  No Known Allergies    Hospital Day: 2 days      Assessment completed within view of staff    History    CC: \"Feel better\"     Followed for: Acute dementia (withdrawal )/ Depression.     Interval HPI: Patient seen and evaluated by me.  Chart reviewed. Reviewed therapist note, seems BZ much more of an issue than previously thought. Up early this morning, rambling thoughts, says not depression, paranoid delusions and perceptions, oriented to time, place " "and person \"talking to doctor what your name\", active auditory and visual hallucination ('sun light bunching off my eye lashes\").       Evasive, avoiding mentioning his CD  Issue. His thoughts are disorganized, having trouble finding the next word or thoughts, almost like an expressive aphasia.   We'll continue with both the Zyprexa and lorazepam detox.    Interval Review of Systems: he  General ROS: negative for - fever or malaise  Endocrine ROS: negative for - palpitations  Respiratory ROS: no cough, shortness of breath, or wheezing  Cardiovascular ROS: no chest pain or dyspnea on exertion  Gastrointestinal ROS: no abdominal pain,no black or bloody stools    /85 (BP Location: Right arm, Patient Position: Lying)  Pulse 59  Temp 98.1 °F (36.7 °C) (Temporal Artery )   Resp 20  Ht 70\" (177.8 cm)  Wt 199 lb 9.6 oz (90.5 kg)  SpO2 98%  BMI 28.64 kg/m2    Mental Status Exam    Mood/Affect: depressed  Thought Processes:  disorganized, barely coherrent. words without conviction or commitment.  Echos clichés from prior treatment interventions.  Thought Content:  dilussional, paranoid and distorted  Hallucination(s): auditory and visual  Hopelessness: yes  Optimistic:minimally  Suicidal Thoughts:  none  Suicidal Plan/Intent: none  Homicidal Thoughts:  absent      Medical Decision Making:   All recent completed LAB results reviewed and discussed with the patient.        Radiology:     Imaging Results (last 24 hours)     Procedure Component Value Units Date/Time    CT Head Without Contrast [[id number]] Collected:  06/13/17 1023     Updated:  06/13/17 1026    Narrative:       CT HEAD WO CONTRAST-     REASON FOR EXAM: mental status change      The ventricular system and subarachnoid spaces of the brain were  appropriate for age. There is no evidence of mass effect or midline  shift. No acute areas of hemorrhage or infarct are identified. There is  no evidence of subdural or epidural hematoma. No focal brain " parenchymal  abnormalities were identified.       Impression:       Negative noncontrasted cranial CT. A source for the  patient's acute mental status change is not identified.           The radiation dose reduction device was utilized for each scan per the  ALARA (as low as reasonably achievable) protocol.     This report was finalized on 6/13/2017 10:24 AM by Dr. Jason Osborn II, MD.               EKG:     ECG/EMG Results (most recent)     Procedure Component Value Units Date/Time    ECG 12 Lead [[id number]] Collected:  06/13/17 1537     Updated:  06/13/17 1747    Narrative:       Test Reason : baseline  Blood Pressure : **/** mmHG  Vent. Rate : 053 BPM     Atrial Rate : 053 BPM     P-R Int : 152 ms          QRS Dur : 092 ms      QT Int : 442 ms       P-R-T Axes : 027 045 -12 degrees     QTc Int : 414 ms    Sinus bradycardia  Otherwise normal ECG  No previous ECGs available  Confirmed by Capri Treadwell ([id number]) on 6/13/2017 5:47:21 PM    Referred By:  LIZETT           Confirmed By:Capri Treadwell           Medications:     multivitamin 1 tablet Oral Daily   nicotine 1 patch Transdermal Daily   OLANZapine zydis 5 mg Oral BID       •  aluminum-magnesium hydroxide-simethicone  •  benzonatate  •  benztropine **OR** benztropine  •  famotidine  •  hydrOXYzine  •  ibuprofen  •  loperamide  •  magnesium hydroxide  •  OLANZapine zydis  •  ondansetron  •  pneumococcal polysaccharide 23-valent  •  sodium chloride  •  traZODone   All medications reviewed.    Special Precautions: Continue current level of Special Precautions.    Assessment/Diagnosis: Active Problems:    Acute delirium    Polysubstance dependence including opioid type drug, continuous use, with delirium    Severe episode of recurrent major depressive disorder, with psychotic features      Treatment Plan: Continue current treatment plan.    Attestation:   Physician Attestation: I attest that the above note accurately reflects work and decisions made by  "me.               Electronically signed by Ranjan King MD at 6/14/2017  9:57 AM             Nursing Assessments (last 72 hours)      Psych PCS (Body System)       06/12/17 2215 06/13/17 0855 06/13/17 1020 06/13/17 1700 06/13/17 2020    Pain/Comfort/Sleep    Presence Of Pain complains of pain/discomfort complains of pain/discomfort complains of pain/discomfort  denies pain/discomfort    Preferred Pain Scale number (Numeric Rating Pain Scale) number (Numeric Rating Pain Scale) number (Numeric Rating Pain Scale)  number (Numeric Rating Pain Scale)    Pain Body Location - Orientation   generalized      Pain Body Location back back       Pain Rating (0-10): Rest 10 2 --   \"can' really say\"  0    Pain Rating (0-10): Activity     0    Nonverbal Indicators Of Pain restless  restless      Comfort/Acceptable Pain Level 5        Pain Onset chronic  --   chronic      Pain Management Interventions pain care plan reviewed with patient/caregiver  diversional activity provided      Sleep/Rest/Relaxation feeling unrested;difficulty falling asleep  no problem identified  difficulty falling asleep;unable to stay asleep    Sleep Hygiene Promotion awakenings minimized;regular sleep pattern promoted  regular sleep pattern promoted      Pain/Comfort/Sleep Interventions    Sleep/Rest Enhancement awakenings minimized;consistent schedule promoted  awakenings minimized      Coping/Psychosocial    Verbalized Emotional State acceptance;anxiety;depression;powerlessness;hopelessness;suicidal thoughts;guilt  anxiety;depression  anxiety;depression    Plan Of Care Reviewed With patient  patient  patient    Patient Agreement with Plan of Care agrees  agrees  agrees    Supportive Measures active listening utilized;verbalization of feelings encouraged  active listening utilized  active listening utilized;verbalization of feelings encouraged    Trust Relationship/Rapport care explained;choices provided;emotional support provided;empathic " "listening provided;questions answered;questions encouraged;reassurance provided;thoughts/feelings acknowledged  questions answered;questions encouraged;reassurance provided;thoughts/feelings acknowledged  thoughts/feelings acknowledged    Coping/Psychosocial Interventions    Behavior Management behavioral plan developed;behavioral plan reviewed  behavioral plan reviewed      Environment Familiarity/Consistency daily routine followed  daily routine followed      Environmental Support calm environment promoted;environmental consistency promoted  calm environment promoted      HEENT    HEENT WDL WDL  WDL      Behavioral    General Appearance WDL  WDL except;appearance  WDL  WDL    General Appearance other (see comments)   disheveled  dress appropriate for weather/appropriate for setting      Behavior WDL    Behavior WDL  WDL except;all  WDL   WDL except;motor movement    Interactions eye contact, hesitant to make;guarded;cooperative;other (see comments)   sentences without substance- illogical   cooperative      Motor Movement apprehensive;restless;tense  apprehensive  apprehensive    Emotion Mood WDL    Emotion/Mood/Affect WDL WDL except;all  WDL except  WDL except;all    Affect flat  flat  flat    Emotion/Mood/Affect anxious;depressed;somber;tense  anxious;depressed  anxious;depressed    Patient rated anxiety level 10  10  8    Patient rated depression level 10  10  8    Speech WDL    Speech WDL  WDL except;speech  WDL  WDL    Speech hyperverbal;response lag        Perceptual State WDL    Perceptual State WDL  WDL except;all   WDL except   \"continuous voices and I try not to listen. See's smilely    WDL except;hallucinations    Hallucinations auditory   hearing voices talking to him  auditory;visual   faces and good and evil battles\"  auditory   \"I hear lots of voices.\"    Perceptual State illusions        Thought Process WDL    Thought Process WDL  WDL except;thought process   WDL except   WDL except;thought process " "   Delusions paranoid   thoughts that people are talking about him  paranoid   \"people  me and I  them\"  paranoid    Judgment and Insight judgment not appropriate to situation;insight not appropriate to situation   feeling judged  insight not appropriate to situation;judgment not appropriate to situation  insight not appropriate to situation;judgment not appropriate to situation    Thought Content helplessness;hopelessness;worthlessness;suicidal thoughts  somatic concerns  somatic concerns    Thought Process disorganized;circumstantial thought;illogical  disorganized  disorganized    Intellectual Performance WDL    Intellectual Performance WDL  WDL except;all   WDL except   WDL except;intellectual performance    Intellectual Performance forgetfulness;impaired concentration;mind wandering;repetition of questions;memory deficit, immediate;memory deficit, remote;disoriented to time  mind wandering  impaired concentration;mind wandering;disoriented to time    Level of Consciousness Alert  Alert      Cognitive    Cognitive/Neuro/Behavioral WDL  WDL except;all   WDL except   WDL except;mood/behavior;orientation    Arousal Level opens eyes spontaneously  opens eyes spontaneously      Orientation disoriented to;time   uncertain of day and date  disoriented to;time  disoriented to;time    Speech illogical  clear      Mood/Behavior anxious;withdrawn;cooperative  anxious;sad  anxious;flat affect    Patient rated craving level 0   denies w/d sx        Cognitive Interventions    Communication Enhancement Strategies extra time allowed for response;one step directions provided;repetition utilized  extra time allowed for response      Orientation Measures clock in view  clock in view      Sensory Stimulation Regulation quiet environment promoted  quiet environment promoted      Neuro    Additional Documentation Cherokee Coma Scale (Group)  [place]rokee Coma Scale (Group)  Pastor Coma Scale (Group)    [place] Coma Scale    Best " Eye Response 4-->(E4) spontaneous  4-->(E4) spontaneous  4-->(E4) spontaneous    Best Motor Response 6-->(M6) obeys commands  6-->(M6) obeys commands  6-->(M6) obeys commands    Best Verbal Response 3-->(V3) inappropriate words   uncertain of day/date- inappropriate use of words in sentanc  3-->(V3) inappropriate words  4-->(V4) confused    Pastor Coma Scale Score 13  13  14    Assessment Qualifiers --   sentences do not answer questions        Respiratory    Respiratory WDL WDL        Cardiac    Cardiac WDL WDL        Peripheral Neurovascular    Peripheral Neurovascular WDL WDL        Gastrointestinal    GI WDL WDL        Genitourinary    Genitourinary WDL WDL        Skin    Skin WDL WDL  WDL  WDL    Cameron Risk Assessment (If Cameron score </= 18, add the appropriate CPG to the care plan)    Sensory Perception   4-->no impairment  4-->no impairment    Moisture   4-->rarely moist  4-->rarely moist    Activity   4-->walks frequently  4-->walks frequently    Mobility   4-->no limitation  4-->no limitation    Nutrition   3-->adequate  3-->adequate    Friction and Shear   3-->no apparent problem  3-->no apparent problem    Cameron Score   22  22    Musculoskeletal    Musculoskeletal WDL WDL  WDL  WDL    Functional Screen Current    Ambulation   0-->independent      Transferring   0-->independent      Toileting   0-->independent      Bathing   0-->independent      Dressing   0-->independent      Eating   0-->independent      Communication   2-->difficulty understanding (not related to language barrier)      Swallowing (if score 2 or more for any item, consult Rehab Services)   0-->swallows foods/liquids without difficulty      Musculoskeletal Interventions    Self-Care Promotion independence encouraged  independence encouraged      Nutrition    Diet/Nutrition Prescription regular  regular      Diet/Feeding Assistance none        Diet/Feeding Tolerance poor  fair  fair    Intake (%)    100%     Safety    Safety ID band on   ID band on  ID band on   pt checked    Patient Location   staff office, specify   with nurse      Observed Behavior sitting  calm      Safety Round Comment sp3  sp3  SP3    Safety Measures safety plan mutually developed;safety plan reviewed  safety plan reviewed  environmental rounds completed;safety rounds completed    Suicide Risk    Suicidal Ideation yes  no  no    Suicide Plan/Availability thoughts of overdosing , uncertain- states that he has had thought        Current Suicidal Precipitating Factors disorganized thoughts- recent detox, unable to think clearly, relationship stressor        Previous Suicide Attempt (describe) denies        Willingness to Contact Staff Member if Feeling Like Hurting Self yes  yes  yes    Homicide Risk    Homicidal Ideation   no  no    Willingness to Contact Staff Member if Feeling Like Hurting Others   yes  yes    [place]     Mental Status   13-->Intermittently confused  13-->Intermittently confused    Elimination   8-->Independent with control of bowel/bladder  8-->Independent with control of bowel/bladder    Medications   10-->No medications  8-->Psychotropic medications (including benzodiazepines and antidepressants)    Diagnosis   8-->Substance abuse/alcohol abuse  10-->Major depressive disorder;8-->Substance abuse/alcohol abuse    Ambulation/Balance   7-->Independent/steady gait/immobile  7-->Independent/steady gait/immobile    Nutrition   12-->Has had very little food or fluids in the past 24 hours  12-->Has had very little food or fluids in the past 24 hours    Sleep Disturbance   12-->Report of sleep disturbance by patient, family or staff  12-->Report of sleep disturbance by patient, family or staff    History of Falls   8-->No history of falls  8-->No history of falls    Leonard Total   86  94    Safety Interventions    Safety Management patient interviewed alone;personal safety plan developed  patient interviewed alone  patient interviewed alone    Safety  Promotion/Fall Prevention safety round/check completed;nonskid shoes/slippers when out of bed;supervised activity  safety round/check completed  safety round/check completed;nonskid shoes/slippers when out of bed    Environmental Safety Modification clutter free environment maintained  clutter free environment maintained  assistive device/personal items within reach;clutter free environment maintained    Daily Care    Activity up ad enrique  up ad enrique  up ad enrique      06/13/17 2109 06/13/17 2209 06/14/17 0838 06/14/17 0840 06/14/17 2025    Pain/Comfort/Sleep    Presence Of Pain complains of pain/discomfort non-verbal indicator of pain/discomfort not present denies pain/discomfort denies pain/discomfort     Preferred Pain Scale number (Numeric Rating Pain Scale)  number (Numeric Rating Pain Scale) number (Numeric Rating Pain Scale)     Pain Body Location - Side Bilateral        Pain Body Location - Orientation lower        Pain Body Location back        Pain Rating (0-10): Rest   0 0     Pain Rating (0-10): Activity 10  0      Pain Management Interventions medicated        Sleep/Rest/Relaxation  appears asleep  no problem identified     Sleep Hygiene Promotion awakenings minimized;regular sleep pattern promoted awakenings minimized  regular sleep pattern promoted     Pain/Comfort/Sleep Interventions    Sleep/Rest Enhancement  awakenings minimized  awakenings minimized     Coping/Psychosocial    Verbalized Emotional State    anxiety;depression acceptance    Plan Of Care Reviewed With    patient patient    Patient Agreement with Plan of Care    agrees agrees    Transition Support    follow up care discussed follow up care discussed    Supportive Measures    active listening utilized active listening utilized    Trust Relationship/Rapport    questions answered;questions encouraged;reassurance provided;thoughts/feelings acknowledged care explained;reassurance provided;thoughts/feelings acknowledged;questions answered     "Family/Support System    Involvement in Therapy     --   Wife/Kristin    Family/Support System Comment     Pt. called his wife and she then talked to this RN. Supportive to pt.     Coping/Psychosocial Interventions    Behavior Management    behavioral plan reviewed     Environment Familiarity/Consistency    daily routine followed     Environmental Support    calm environment promoted     HEENT    HEENT WDL    WDL     Behavioral    General Appearance WDL    WDL WDL    General Appearance    dress appropriate for weather/appropriate for setting dress appropriate for weather/appropriate for setting    Behavior WDL    Behavior WDL    WDL  WDL except    Interactions    cooperative cooperative;eye contact, intense    Motor Movement    apprehensive apprehensive    Emotion Mood WDL    Emotion/Mood/Affect WDL    WDL except WDL except    Affect    flat flat    Emotion/Mood/Affect     anxious;depressed;tense    Patient rated anxiety level    0 10    Patient rated depression level    0 10    Speech WDL    Speech WDL    WDL WDL    Speech    response lag;soft/quiet moderate rate and volume;rambling    Perceptual State WDL    Perceptual State WDL     WDL except;hallucinations  WDL except    Hallucinations    visual   sees things floating around like science visual   \"I see good things, have you ever done that?\"    Perceptual State    illusions illusions    Thought Process WDL    Thought Process WDL     WDL except;thought process  WDL except    Judgment and Insight    insight not appropriate to situation;judgment not appropriate to situation insight not appropriate to situation;judgment not appropriate to situation    Thought Content    denial denial;other (see comments)   Pt. is intermittentlyu confused but sometimes goal oriented.    Thought Process    disorganized flight of ideas;relevant   Sometimes relevant then lapses into flight of ideas.     Intellectual Performance WDL    Intellectual Performance WDL     WDL except  WDL " except    Intellectual Performance    impaired concentration impaired concentration    Level of Consciousness    Alert Alert    Cognitive    Cognitive/Neuro/Behavioral WDL     WDL except WDL    Arousal Level    opens eyes spontaneously     Orientation    oriented x 4 oriented x 4    Speech    garbled     Mood/Behavior     anxious;restless    Additional Documentation     Cravings;Current withdrawal symptoms    Patient rated craving level     0    Current withdrawal symptoms     Other (comment)   Denies    Cognitive Interventions    Communication Enhancement Strategies    extra time allowed for response extra time allowed for response;repetition utilized    Orientation Measures    clock in view clock in view    Sensory Stimulation Regulation    quiet environment promoted care clustered;music/television provided for relaxation;quiet environment promoted    Neuro    Additional Documentation    Pastor Coma Scale (Group) Pastor Coma Scale (Group)    Pastor Coma Scale    Best Eye Response    4-->(E4) spontaneous 4-->(E4) spontaneous    Best Motor Response    6-->(M6) obeys commands 6-->(M6) obeys commands    Best Verbal Response    5-->(V5) oriented 4-->(V4) confused    [place] Coma Scale Score    15 14    Skin    Skin WDL    WDL WDL    Cameron Risk Assessment (If Cameron score </= 18, add the appropriate CPG to the care plan)    Sensory Perception    4-->no impairment 4-->no impairment    Moisture    4-->rarely moist 4-->rarely moist    Activity    4-->walks frequently 4-->walks frequently    Mobility    4-->no limitation 4-->no limitation    Nutrition    3-->adequate 3-->adequate    Friction and Shear    3-->no apparent problem 3-->no apparent problem    Cameron Score    22 22    Musculoskeletal    Musculoskeletal WDL    WDL WDL    Functional Screen Current    Ambulation    0-->independent 0-->independent    Transferring    0-->independent 0-->independent    Toileting    0-->independent 0-->independent    Bathing     "0-->independent 0-->independent    Dressing    0-->independent 0-->independent    Eating    0-->independent 0-->independent    Communication    0-->understands/communicates without difficulty 0-->understands/communicates without difficulty    Swallowing (if score 2 or more for any item, consult Rehab Services)    0-->swallows foods/liquids without difficulty 0-->swallows foods/liquids without difficulty    Musculoskeletal Interventions    Self-Care Promotion    independence encouraged independence encouraged    Nutrition    Diet/Nutrition Prescription    regular regular    Diet/Feeding Tolerance    fair     Safety    Safety    ID band on ID band on;bed in low position    Patient Location    day room     Observed Behavior    calm;sitting pacing    Safety Round Comment    sp3     Safety Measures    safety rounds completed safety rounds completed    Suicide Risk    Suicidal Ideation    no yes    Suicide Plan/Availability     --   \"Sometimes I want to die.\" No plan.     Willingness to Contact Staff Member if Feeling Like Hurting Self    yes yes    Homicide Risk    Homicidal Ideation    no no    Willingness to Contact Staff Member if Feeling Like Hurting Others    yes yes    [place]     Mental Status    13-->Intermittently confused 13-->Intermittently confused    Elimination    8-->Independent with control of bowel/bladder 8-->Independent with control of bowel/bladder    Medications    8-->Psychotropic medications (including benzodiazepines and antidepressants) 8-->Psychotropic medications (including benzodiazepines and antidepressants)    Diagnosis    10-->Major depressive disorder 10-->Major depressive disorder    Ambulation/Balance    7-->Independent/steady gait/immobile 7-->Independent/steady gait/immobile    Nutrition    12-->Has had very little food or fluids in the past 24 hours 12-->Has had very little food or fluids in the past 24 hours    Sleep Disturbance    12-->Report of sleep disturbance by patient, " family or staff 12-->Report of sleep disturbance by patient, family or staff    History of Falls    8-->No history of falls 8-->No history of falls    Leonard Total    86 86    Safety Interventions    Safety Management    patient interviewed alone patient interviewed alone    Safety Promotion/Fall Prevention    safety round/check completed safety round/check completed;nonskid shoes/slippers when out of bed    Environmental Safety Modification    clutter free environment maintained clutter free environment maintained    Daily Care    Activity    up ad enrique up ad enrique      06/14/17 2110 06/15/17 0705 06/15/17 0800 06/15/17 0805 06/15/17 1047    Pain/Comfort/Sleep    Presence Of Pain denies pain/discomfort complains of pain/discomfort   denies pain/discomfort    Preferred Pain Scale number (Numeric Rating Pain Scale) number (Numeric Rating Pain Scale)       Pain Body Location - Orientation  generalized       Pain Body Location  back       Pain Frequency  constant       Pain Quality  aching       Pain Rating (0-10): Rest 0        Pain Management Interventions  medicated   pt requesting PRN Motrin- see MAR       Sleep/Rest/Relaxation difficulty falling asleep   pt requesting prn sleep aid   unable to go back to sleep;nightmares   Reports nightmares about his loved ones.     Sleep Hygiene Promotion    awakenings minimized;noise level reduced;regular sleep pattern promoted;relaxation techniques promoted     Fever Reduction/Comfort Measures medication administered   see 15 min check for sleep follow up        Pain/Comfort/Sleep Interventions    Sleep/Rest Enhancement    consistent schedule promoted;regular sleep/rest pattern promoted;noise level reduced;relaxation techniques promoted     Coping/Psychosocial    Verbalized Emotional State    anxiety;guilt     Plan Of Care Reviewed With    patient     Patient Agreement with Plan of Care    agrees     Transition Support    follow up care discussed     Complementary Therapy     "music therapy     Supportive Measures    active listening utilized;self-responsibility promoted;self-reflection promoted;self-care encouraged;goal setting facilitated;problem solving facilitated     Family/Support System Care    self-care encouraged     Trust Relationship/Rapport    care explained;choices provided;emotional support provided;empathic listening provided;reassurance provided;thoughts/feelings acknowledged     Additional Documentation    Group Therapy Session (Group)     Coping/Psychosocial Interventions    Behavior Management    behavioral plan reviewed     Environment Familiarity/Consistency    daily routine followed;personal clothing/items utilized     Environmental Support    calm environment promoted;rest periods encouraged;personal routine supported     HEENT    HEENT WDL    WDL     Behavioral    General Appearance WDL    WDL     General Appearance    dress appropriate for weather/appropriate for setting     Behavior WDL    Behavior WDL     WDL except;interactions     Interactions    eye contact, intermittent;guarded;cooperative     Motor Movement    apprehensive     Emotion Mood WDL    Emotion/Mood/Affect WDL    WDL except;all     Affect    blunted     Emotion/Mood/Affect    anxious;sad     Patient rated anxiety level    10     Patient rated depression level    0     Speech WDL    Speech WDL     WDL except;speech     Speech    moderate rate and volume;response lag;paucity     Perceptual State WDL    Perceptual State WDL     WDL except;hallucinations   \"I think sometimes in my dreams.\"     Hallucinations    auditory;visual   \"I am better now, I see things in my dreams\"     Perceptual State    illusions     Thought Process WDL    Thought Process WDL     WDL except;thought process     Delusions    paranoid     Judgment and Insight    insight not appropriate to situation     Thought Content    helplessness;worthlessness     Thought Process    disorganized     Intellectual Performance WDL    " Intellectual Performance WDL     WDL except     Intellectual Performance    impaired concentration     Level of Consciousness    Alert     Cognitive    Cognitive/Neuro/Behavioral WDL     WDL except;mood/behavior     Arousal Level    opens eyes spontaneously     Orientation    oriented x 4     Speech    clear;pace/rate variance     Mood/Behavior    anxious;cooperative     Additional Documentation    Cravings;Current withdrawal symptoms     Patient rated craving level    10     Current withdrawal symptoms    Anxiety;Craving;Body aches     CIWA-Ar    Nausea and Vomiting    0-->no nausea and no vomiting     Tremor    0-->no tremor     Paroxysmal Sweats    0-->no sweat visible     Anxiety    2     Agitation    0-->normal activity     Tactile Disturbances    0-->none     Auditory Disturbances    0-->not present     Visual Disturbances    0-->not present     Headache, Fullness in Head    0-->not present     Orientation and Clouding of Sensorium    0-->oriented and can do serial additions     Total CIWA-Ar Score    2     COWS Assessment    Resting Pulse Rate    Pulse rate 80 or below     Sweating    No report of chills or flushing     Restlessness    Reports difficulty sitting still, but is able to do so)     Pupil Size    Pupils pinned or normal size for room light     Bone or Joint Aches    Mild/diffuse discomfort     Runny Nose or Tearing    None present     GI Upset    No GI symptoms     Tremor    No tremor     Yawning    No yawning     Anxiety or Irritability    Patient reports increasing irritability or anxiousness     Gooseflesh Skin    None     COWS Total    3 - Mild     Cognitive Interventions    Communication Enhancement Strategies    extra time allowed for response;one step directions provided;repetition utilized     Orientation Measures    clock in view;reorientation provided     Sensory Stimulation Regulation    care clustered;music/television provided for relaxation     Neuro    Additional Documentation     Pastor Coma Scale (Group)     [place] Coma Scale    Best Eye Response    4-->(E4) spontaneous     Best Motor Response    6-->(M6) obeys commands     Best Verbal Response    4-->(V4) confused     Pastor Coma Scale Score    14     Neurological Interventions    Hearing/Communication Enhancement    background noise decreased     Respiratory    Respiratory WDL    WDL     Oxygen Therapy    O2 Device    room air     Cardiac    Cardiac WDL    WDL     Peripheral Neurovascular    Peripheral Neurovascular WDL    WDL     Gastrointestinal    GI WDL    WDL     Last Bowel Movement    06/14/17     Genitourinary    Genitourinary WDL    WDL     Skin    Skin WDL    WDL     Cameron Risk Assessment (If Cameron score </= 18, add the appropriate CPG to the care plan)    Sensory Perception    4-->no impairment     Moisture    4-->rarely moist     Activity    4-->walks frequently     Mobility    4-->no limitation     Nutrition    3-->adequate     Friction and Shear    3-->no apparent problem     Cameron Score    22     Musculoskeletal    Musculoskeletal WDL    WDL     Functional Screen Current    Ambulation    0-->independent     Transferring    0-->independent     Toileting    0-->independent     Bathing    0-->independent     Dressing    0-->independent     Eating    0-->independent     Communication    0-->understands/communicates without difficulty     Swallowing (if score 2 or more for any item, consult Rehab Services)    0-->swallows foods/liquids without difficulty     Musculoskeletal Interventions    Self-Care Promotion    independence encouraged     Nutrition    Diet/Nutrition Prescription    regular     Diet/Feeding Assistance    none     Diet/Feeding Tolerance    good     Fluids    adequate     Nutrition Risk Screen    no indicators present     Safety    Safety   wooden bed;bed in low position;ID band on   Patient safety check complete wooden bed;ID band on   Patient safety check complete     Patient Location    patient's room      Observed Behavior    calm;sitting     Suicide Risk    Suicidal Ideation    no     Suicide Plan/Availability    Denies at present     Current Suicidal Precipitating Factors    disorganized thoughts     Previous Suicide Attempt (describe)    Denies     Willingness to Contact Staff Member if Feeling Like Hurting Self    yes     Homicide Risk    Homicidal Ideation    no     Willingness to Contact Staff Member if Feeling Like Hurting Others    yes     [place]     Mental Status    13-->Intermittently confused     Elimination    8-->Independent with control of bowel/bladder     Medications    8-->Psychotropic medications (including benzodiazepines and antidepressants)     Diagnosis    10-->Major depressive disorder     Ambulation/Balance    7-->Independent/steady gait/immobile     Nutrition    0-->No apparent abnormalities with appetite     Sleep Disturbance    12-->Report of sleep disturbance by patient, family or staff     History of Falls    8-->No history of falls     Leonard Total    74     Safety Interventions    Safety Management    patient interviewed alone     Safety Promotion/Fall Prevention    safety round/check completed;nonskid shoes/slippers when out of bed;supervised activity     Environmental Safety Modification    clutter free environment maintained;room organization consistent;room near unit station     Daily Care    Activity    activity adjusted per tolerance;up ad enrique

## 2017-06-15 NOTE — PROGRESS NOTES
"    Data: Met with patient and discussed with him going to rehab. Informed him that I had spoke with his wife and she was not willing for him to return home and she wanted him to go to rehab. Patient was agreeable for me to send out referrals. There is one in [place] that was recommended by his Sponsor at [facility] in [facility]. He signed a consent for [facility]; [facility], [facility] and [facility].    Patient stated that he feels \"heavy\" today. Stated that he did not sleep well. Patient states that he feels like everything is \"falling down\". Patient stated that he thinks that others can read his mind.  Then he went on to say \"isn't that crazy\".  Provided emotional support for the patient.  Patient states that he continues to hear voices that are recounting the day over and over in his Mind and telling him \"are you sure or  are you not?      Assessment: Patient states that he continues to have withdrawal symptoms of feeling anxious and jittery with pacing the floor; nausea; however hot and cold sweats and denies diarrhea he stated that it was better but \"he can't check his rear because of what he done to his body\". Patient reports depression and anxiety at a 10 on a 1-10 scale. Reports having craving for beer and xanax. Patient reports having hallucinations. Patient continues to have delirum from Xanax withdrawals.    Plan: Patient will continue hospitalization. Not safe for discharge at this time. Patient is agreeable for rehab referrals. He has signed consents for [facility]; [facility]; [facility] and [facility].  "

## 2017-06-16 PROCEDURE — 99233 SBSQ HOSP IP/OBS HIGH 50: CPT | Performed by: PSYCHIATRY & NEUROLOGY

## 2017-06-16 RX ORDER — LORAZEPAM 2 MG/1
2 TABLET ORAL 2 TIMES DAILY
Status: DISCONTINUED | OUTPATIENT
Start: 2017-06-16 | End: 2017-06-17

## 2017-06-16 RX ORDER — METOPROLOL TARTRATE 50 MG/1
50 TABLET, FILM COATED ORAL ONCE
Status: COMPLETED | OUTPATIENT
Start: 2017-06-16 | End: 2017-06-16

## 2017-06-16 RX ADMIN — LORAZEPAM 2 MG: 2 TABLET ORAL at 10:02

## 2017-06-16 RX ADMIN — IBUPROFEN 600 MG: 400 TABLET, FILM COATED ORAL at 10:45

## 2017-06-16 RX ADMIN — METOPROLOL TARTRATE 50 MG: 50 TABLET, FILM COATED ORAL at 10:02

## 2017-06-16 RX ADMIN — NICOTINE 1 PATCH: 21 PATCH TRANSDERMAL at 08:40

## 2017-06-16 RX ADMIN — OLANZAPINE 5 MG: 5 TABLET, ORALLY DISINTEGRATING ORAL at 08:40

## 2017-06-16 RX ADMIN — OLANZAPINE 5 MG: 5 TABLET, ORALLY DISINTEGRATING ORAL at 17:08

## 2017-06-16 RX ADMIN — IBUPROFEN 600 MG: 400 TABLET, FILM COATED ORAL at 23:02

## 2017-06-16 RX ADMIN — Medication 1 TABLET: at 08:40

## 2017-06-16 RX ADMIN — THIAMINE HCL (VITAMIN B1) 50 MG TABLET 100 MG: at 08:40

## 2017-06-16 RX ADMIN — HYDROXYZINE HYDROCHLORIDE 50 MG: 50 TABLET ORAL at 10:45

## 2017-06-16 RX ADMIN — DIVALPROEX SODIUM 500 MG: 500 TABLET, FILM COATED, EXTENDED RELEASE ORAL at 22:34

## 2017-06-16 RX ADMIN — LORAZEPAM 2 MG: 2 TABLET ORAL at 17:08

## 2017-06-16 RX ADMIN — TRAZODONE HYDROCHLORIDE 50 MG: 50 TABLET ORAL at 22:35

## 2017-06-16 NOTE — PLAN OF CARE
"Problem:  Patient Care Overview (Adult)  Goal: Plan of Care Review  Outcome: Ongoing (interventions implemented as appropriate)  Patient reports A/V hallucinations \"At times\" and laughed during assessment. Patient unable to rate anxiety stating \"Better\" and said \"Yes-10\" when asked to rate depression. Patient is calm and cooperative but continues to have flight of ideas and scattered thoughts through out the day. Patient reported having cravings for alcohol and was unable to tell me about any withdrawal symptoms he was having. Sleep and appetite reported as good last shift and today the same. Patient denies any thoughts to harm self. Will continue to monitor.    06/16/17 3151   Coping/Psychosocial Response Interventions   Plan Of Care Reviewed With patient   Coping/Psychosocial   Patient Agreement with Plan of Care agrees   Patient Care Overview   Progress improving       Goal: Interdisciplinary Rounds/Family Conference  Outcome: Ongoing (interventions implemented as appropriate)  Goal: Individualization and Mutuality  Outcome: Ongoing (interventions implemented as appropriate)  Goal: Discharge Needs Assessment  Outcome: Ongoing (interventions implemented as appropriate)    Problem:  Overarching Goals  Goal: Adheres to Safety Considerations for Self and Others  Outcome: Ongoing (interventions implemented as appropriate)  Goal: Optimized Coping Skills in Response to Life Stressors  Outcome: Ongoing (interventions implemented as appropriate)  Goal: Develops/Participates in Therapeutic [place] to Support Successful Transition  Outcome: Ongoing (interventions implemented as appropriate)      "

## 2017-06-16 NOTE — PLAN OF CARE
Problem:  Patient Care Overview (Adult)  Goal: Interdisciplinary Rounds/Family Conference  Outcome: Ongoing (interventions implemented as appropriate)    06/16/17 1623   Interdisciplinary Rounds/Family Conf   Summary Individual session   Participants patient;social work      D: Therapist met with patient on this date for individual to discuss patient needs and treatment progress.  Patient understands himself as covering therapist and patient was agreeable.  Patient reported he cannot remember which therapist he had spoken to in the past.  Patient seemed clear and well spoken overall but had moments of disorganization in his thoughts.  He wants make comments about being back in a corner which did not applied our conversation and then questioned the day of the week and the date.  He reports that he has spoken with his wife and she is encouraging him to attend a 30 day program in [place] and he is agreeable.  Therapist discussed that we would work with him and his wife to attempt to make that happen as his plan.     Therapist spoke with patient's wife who reports she is very focused on him attending the [facility] program.  She reports that his sponsor has called and spoken with them and they said once he has completed detox and psychiatric treatment and is stable they were willing to take him into the program.  Therapist discussed with her that we had done a referral and their psychiatrists and declined due to his current mental state but that we would send updated information as patient becomes more clear and hopeful they would accept him for admission.  She reported she would be at the unit tomorrow for visitation and requested to meet with therapist at that time.     A: Patient mood and affect appeared euthymic.  Patient denies current SI/HI/AVH.  Patient appears to have some continued disorganization and confusion at times.     P; patient remains hospitalized for safety and stabilization.  Therapist  and treatment team will continue to work to establish appropriate disposition for patient.

## 2017-06-16 NOTE — PLAN OF CARE
"Problem:  Patient Care Overview (Adult)  Goal: Plan of Care Review  Outcome: Ongoing (interventions implemented as appropriate)    06/16/17 4084   Coping/Psychosocial Response Interventions   Plan Of Care Reviewed With patient   Coping/Psychosocial   Patient Agreement with Plan of Care agrees   Outcome Evaluation   Outcome Summary/Follow up Plan Pt cooperative but confused this shift. Pt could not state where he was and stated \"I'm at the helping place.\" When asked about hallucinations pt stated \"it's just pitiful.\" Pt would not rate anxiety and depression and would only state that \"they are stable and better.\" Denies SI. Pt awake frequently through the night.    Patient Care Overview   Progress no change           "

## 2017-06-16 NOTE — PROGRESS NOTES
"      PROGRESS NOTE  Clinician: KYM King MD  Admission Date: 6/12/2017  8:19 AM 06/16/17    Behavioral Health Treatment Plan and Problem List: I have reviewed and approved the Behavioral Health Treatment Plan and Problem list.    Allergies  No Known Allergies    Hospital Day: 4 days      Assessment completed within view of staff    History    CC:\"Still struggling with confrontation, when I reaction positive and negative, need to start using those needs\".    Followed for: acute dementia/   Interval HPI: Patient seen and evaluated by me.  Chart reviewed.  \"Getting better\", feeling hopeful, no suicidal ideation, thoughts still disorganized and actually tangential, no pressure speech or thoughts. \"Thoughts a like wheels in my head that's why I like music\". Paranoid perhaps relates to his life style and \"drug dealers\". Has felt threatened in here my another man patient, probably his distorted thinking.  Slept poorly.   \"Too much to be grateful for to mess it up\".     Talked to the patient's wife at length ([phone]).  Patient has no history of leda, has had periods of depression associated with adverse events in his continuing drug use in the past.  His wife is familiar with bipolar illness in that her grandmother had this difficulty.    The patient Saw a psychiatrist in [place] for maybe a year who prescribed an antidepressant and benzodiazepine's, patient stopped the antidepressive but continued with his psychiatrist \"to get the prescription for benzos, he does have a problem with anxiety\".  The patient was taking 120 mg of methadone daily and unknown amount of Xanax prior to entering the detox program in [place] several weeks prior to admission here.  His drug dealing, suppler, had been arrested. He  left there against their recommendation and was given 3-4 days of the benzos to complete his detox.  Almost immediately after he ran out of his supply of benzos he went into his current state, a " "withdrawal delirium.  No seizures.    Patient has a long history of abuse of opiates and benzodiazepine's, longest and last period of abstinence was 3 or 4 years ago for 90 days. Wife verbalized insights about enabling and says: \"he can't come back home unless he goes into residential treatment, never have said this, I know I have to do that now\". Has contact a treatment facility and [facility], the [facility] who are expecting the patient when his mental status clears. The are requesting copies of his discharge summary, fax [phone].     Interval Review of Systems:   General ROS: negative for - fever or malaise  Endocrine ROS: negative for - palpitations  Respiratory ROS: no cough, shortness of breath, or wheezing  Cardiovascular ROS: no chest pain or dyspnea on exertion  Gastrointestinal ROS: no abdominal pain,no black or bloody stools    /99 (BP Location: Right arm, Patient Position: Lying)  Pulse 70  Temp 98.2 °F (36.8 °C) (Temporal Artery )   Resp 18  Ht 70\" (177.8 cm)  Wt 199 lb 9.6 oz (90.5 kg)  SpO2 98%  BMI 28.64 kg/m2    Mental Status Exam    Mood/Affect: depressed  Thought Processes:  loose associations  Thought Content:  paranoid and distorted  Hallucination(s): auditory  Hopelessness: no  Optimistic:minimally  Suicidal Thoughts:  none  Suicidal Plan/Intent: none  Homicidal Thoughts:  absent  Cognitive function: Patient is oriented to person date and place.    Medical Decision Making:   All recent completed LAB results reviewed and discussed with the patient.        Radiology:     Imaging Results (last 24 hours)     ** No results found for the last 24 hours. **            EKG:     ECG/EMG Results (most recent)     Procedure Component Value Units Date/Time    ECG 12 Lead [[id number]] Collected:  06/13/17 1537     Updated:  06/13/17 1747    Narrative:       Test Reason : baseline  Blood Pressure : **/** mmHG  Vent. Rate : 053 BPM     Atrial Rate : 053 BPM     " P-R Int : 152 ms          QRS Dur : 092 ms      QT Int : 442 ms       P-R-T Axes : 027 045 -12 degrees     QTc Int : 414 ms    Sinus bradycardia  Otherwise normal ECG  No previous ECGs available  Confirmed by Capri Treadwell ([id number]) on 6/13/2017 5:47:21 PM    Referred By:  LIZETT           Confirmed By:Capri Treadwell           Medications:     divalproex 500 mg Oral Nightly   multivitamin 1 tablet Oral Daily   nicotine 1 patch Transdermal Daily   OLANZapine zydis 5 mg Oral BID   vitamin B-1 100 mg Oral Daily       •  aluminum-magnesium hydroxide-simethicone  •  benzonatate  •  benztropine **OR** benztropine  •  famotidine  •  hydrOXYzine  •  ibuprofen  •  loperamide  •  magnesium hydroxide  •  OLANZapine zydis  •  ondansetron  •  pneumococcal polysaccharide 23-valent  •  sodium chloride  •  traZODone   All medications reviewed.    Special Precautions: Continue current level of Special Precautions.    Assessment/Diagnosis: Active Problems:    Delirium    Opioid dependence with withdrawal    Bipolar I, most recent episode mixed, severe with psychotic behavior    Benzodiazepine dependence    Cannabis abuse      Treatment Plan: Continue current treatment plan.    Attestation:   Physician Attestation: I attest that the above note accurately reflects work and decisions made by me.

## 2017-06-17 LAB — GLUCOSE BLDC GLUCOMTR-MCNC: 95 MG/DL (ref 70–130)

## 2017-06-17 PROCEDURE — 99232 SBSQ HOSP IP/OBS MODERATE 35: CPT | Performed by: PSYCHIATRY & NEUROLOGY

## 2017-06-17 PROCEDURE — 82962 GLUCOSE BLOOD TEST: CPT

## 2017-06-17 RX ORDER — LORAZEPAM 0.5 MG/1
0.5 TABLET ORAL
Status: COMPLETED | OUTPATIENT
Start: 2017-06-19 | End: 2017-06-19

## 2017-06-17 RX ORDER — LORAZEPAM 0.5 MG/1
0.5 TABLET ORAL EVERY 4 HOURS PRN
Status: ACTIVE | OUTPATIENT
Start: 2017-06-19 | End: 2017-06-20

## 2017-06-17 RX ORDER — LORAZEPAM 1 MG/1
1 TABLET ORAL
Status: COMPLETED | OUTPATIENT
Start: 2017-06-18 | End: 2017-06-18

## 2017-06-17 RX ORDER — CLONIDINE HYDROCHLORIDE 0.1 MG/1
0.1 TABLET ORAL ONCE
Status: COMPLETED | OUTPATIENT
Start: 2017-06-17 | End: 2017-06-17

## 2017-06-17 RX ORDER — CLONIDINE HYDROCHLORIDE 0.1 MG/1
TABLET ORAL
Status: COMPLETED
Start: 2017-06-17 | End: 2017-06-17

## 2017-06-17 RX ORDER — LORAZEPAM 1 MG/1
1 TABLET ORAL EVERY 4 HOURS PRN
Status: ACTIVE | OUTPATIENT
Start: 2017-06-18 | End: 2017-06-19

## 2017-06-17 RX ADMIN — OLANZAPINE 5 MG: 5 TABLET, ORALLY DISINTEGRATING ORAL at 08:33

## 2017-06-17 RX ADMIN — LORAZEPAM 2 MG: 2 TABLET ORAL at 08:33

## 2017-06-17 RX ADMIN — HYDROXYZINE HYDROCHLORIDE 50 MG: 50 TABLET ORAL at 13:11

## 2017-06-17 RX ADMIN — LORAZEPAM 1.5 MG: 1 TABLET ORAL at 21:18

## 2017-06-17 RX ADMIN — IBUPROFEN 600 MG: 400 TABLET, FILM COATED ORAL at 13:11

## 2017-06-17 RX ADMIN — FAMOTIDINE 20 MG: 20 TABLET, FILM COATED ORAL at 17:19

## 2017-06-17 RX ADMIN — NICOTINE 1 PATCH: 21 PATCH TRANSDERMAL at 08:34

## 2017-06-17 RX ADMIN — THIAMINE HCL (VITAMIN B1) 50 MG TABLET 100 MG: at 08:34

## 2017-06-17 RX ADMIN — OLANZAPINE 5 MG: 5 TABLET, ORALLY DISINTEGRATING ORAL at 17:17

## 2017-06-17 RX ADMIN — CLONIDINE HYDROCHLORIDE 0.1 MG: 0.1 TABLET ORAL at 01:22

## 2017-06-17 RX ADMIN — Medication 1 TABLET: at 08:33

## 2017-06-17 RX ADMIN — DIVALPROEX SODIUM 500 MG: 500 TABLET, FILM COATED, EXTENDED RELEASE ORAL at 21:48

## 2017-06-17 RX ADMIN — LORAZEPAM 1.5 MG: 1 TABLET ORAL at 14:24

## 2017-06-17 RX ADMIN — TRAZODONE HYDROCHLORIDE 50 MG: 50 TABLET ORAL at 21:18

## 2017-06-17 NOTE — NURSING NOTE
"Pt requesting food and stood up at nurses station and grabbed a box of cereal. Lead RN Queenie asked patient to please return the cereal as staff wanted to make sure he was okay before we let him eat anything. Pt then threw box of cereal at LUKAS Jerome. Staff attempted to assist patient into wheelchair to take to his room and patient refused. Pt then layed his head against the nursing station. Upon asking patient to open his eyes to look at this nurse, pt's pupils were noted to be pinpoint. Asked patient if he had taken any substances other than the medications given to him by staff. Pt denied taking any substances and then began to use profanity and hit himself in the back of the head. Pt able to be redirected by staff. Pt's room then searched by 2 staff. No illicit substances found in patients room. Security called to unit to assist patient back to bed. Pt stated he could not stand and security had to lift patient into wheelchair. Upon arrival to room pt was then lifted to his bed with assistance from security and staff. Pt continues to be alert and oriented and is still able to follow commands moving all 4 extremities without difficulty. Pt then moved himself up in the bed and turned over without assistance. Pt then stated to this nurse \"no one here fucking believes anything I say.\" Will continue to monitor.   "

## 2017-06-17 NOTE — NURSING NOTE
Pt in bed in room requesting food to eat. Cereal bars given to patient. VSS. BP- 131/91, HR- 57. Pt noted to be lying in bed eating cereal bars without difficulty. Will continue to monitor.

## 2017-06-17 NOTE — PROGRESS NOTES
"Behavioral Health Treatment Plan and Problem List: I have reviewed and approved the Behavioral Health Treatment Plan and Problem list.     LOS: 5 days     Allergies  No Known Allergies    Assessment completed within view of staff    Chief Complaint:  Bipolar and delirium    Subjective     Interval History: The patient states that he doesn't know what happened last night with his left arm going one way and the right arm the other, but he didn't lose consciousness during the episode. He continues to ramble about different topics. He states that he is experiencing cravings for benzos because he knows that benzos and opiates will get him back on track.  He asked if narcissistic personality matches him as he said that he read about it and he has acted like it in the past.    Review of Systems:   General ROS: negative for - fever or malaise  Endocrine ROS: negative for - palpitations  Respiratory ROS: no cough, shortness of breath, or wheezing  Cardiovascular ROS: no chest pain or dyspnea on exertion  Gastrointestinal ROS: no abdominal pain,no black or bloody stools    Objective     Vital Signs  /100 (BP Location: Right arm, Patient Position: Sitting)  Pulse 89  Temp 97.6 °F (36.4 °C) (Temporal Artery )   Resp 18  Ht 70\" (177.8 cm)  Wt 199 lb 9.6 oz (90.5 kg)  SpO2 99%  BMI 28.64 kg/m2    Mental Status Exam:   Mood/Affect: sad/depressed  Thought Processes:  tangential  Thought Content:  delusional  Hallucination(s): no  Hopelessness: yes  Suicidal Thoughts:  none  Suicidal Plan/Intent: none  Homicidal Thoughts:  absent     Results Review:    Lab Results (last 24 hours)     Procedure Component Value Units Date/Time    POC Glucose Fingerstick [[id number]]  (Normal) Collected:  06/17/17 0135    Specimen:  Blood Updated:  06/17/17 0142     Glucose 95 mg/dL     Narrative:       Meter: [id number] : [id number] DARCI WELLS        Imaging Results (last 24 hours)     ** No results found for the last 24 hours. **    "       Medication Review:   Scheduled Medications:    divalproex 500 mg Oral Nightly   LORazepam 2 mg Oral BID   multivitamin 1 tablet Oral Daily   nicotine 1 patch Transdermal Daily   OLANZapine zydis 5 mg Oral BID   vitamin B-1 100 mg Oral Daily        PRN Medications:  •  aluminum-magnesium hydroxide-simethicone  •  benzonatate  •  benztropine **OR** benztropine  •  famotidine  •  hydrOXYzine  •  ibuprofen  •  loperamide  •  magnesium hydroxide  •  OLANZapine zydis  •  ondansetron  •  pneumococcal polysaccharide 23-valent  •  sodium chloride  •  traZODone   All medications reviewed.      Assessment/Plan   Patient Active Problem List   Diagnosis Code   • Delirium R41.0   • Opioid dependence with withdrawal F11.23   • Bipolar I, most recent episode mixed, severe with psychotic behavior F31.64   • Benzodiazepine dependence F13.20   • Cannabis abuse F12.10     Plan:  - The patient continues to be confused and restless, could be due to delayed withdrawals from Xanax. He is on Ativan 2 mg bid. It will be changed to Ativan detox day 2 today.        London Vanegas MD  06/17/17  12:29 PM

## 2017-06-17 NOTE — PROGRESS NOTES
Therapist met with patient and his wife during family visitation to discuss patient needs and progress.  Patient's wife expressed concern the patient reported a possible seizure to her from last night.  Therapist inquired about this with nursing staff reported that they thought patient was having somatic symptoms and not a real seizure.  They report that when staff checked on patient he was able to follow commands completely during the time he said he was having  Seizure.  Patient's wife continues to be concerned the patient's symptoms clear up and he be admitted in [facility] in [place].

## 2017-06-17 NOTE — PLAN OF CARE
Problem: BH Patient Care Overview (Adult)  Goal: Plan of Care Review  Outcome: Ongoing (interventions implemented as appropriate)    06/17/17 0520   Coping/Psychosocial Response Interventions   Plan Of Care Reviewed With patient   Coping/Psychosocial   Patient Agreement with Plan of Care agrees   Outcome Evaluation   Outcome Summary/Follow up Plan Pt slept at beginning of shift. Awakened around 2030 and was cooperative with no issues noted. Later in shift pt came out to dayroom exhibiting jerking movements and elevated BP. Pt noted to stop jerking movements when staff was out of his view. See nursing notes for details. Pt then went back to sleep with no distress noted.    Patient Care Overview   Progress no change

## 2017-06-17 NOTE — PLAN OF CARE
"Problem: BH Patient Care Overview (Adult)  Goal: Plan of Care Review  Outcome: Ongoing (interventions implemented as appropriate)  Patient has been calm and cooperative this shift. Patient reports anxiety and depression both at 5 on 0-10 scale with no SI to report. Patient reports hearing AH stating \"These voices are telling me to hurt people.\" Patient mood is calm and reports medications are helping. Patient appetite is good and sleep was fair last night.Will continue to monitor closely.    06/17/17 1502   Coping/Psychosocial Response Interventions   Plan Of Care Reviewed With patient   Coping/Psychosocial   Patient Agreement with Plan of Care agrees   Patient Care Overview   Progress improving       Goal: Interdisciplinary Rounds/Family Conference  Outcome: Ongoing (interventions implemented as appropriate)  Goal: Individualization and Mutuality  Outcome: Ongoing (interventions implemented as appropriate)  Goal: Discharge Needs Assessment  Outcome: Ongoing (interventions implemented as appropriate)    Problem:  Overarching Goals  Goal: Adheres to Safety Considerations for Self and Others  Outcome: Ongoing (interventions implemented as appropriate)  Goal: Optimized Coping Skills in Response to Life Stressors  Outcome: Ongoing (interventions implemented as appropriate)  Goal: Develops/Participates in Therapeutic [place] to Support Successful Transition  Outcome: Ongoing (interventions implemented as appropriate)      "

## 2017-06-17 NOTE — NURSING NOTE
Pt came out of room requesting some cereal to eat. Upon staff getting the cereal the patient began to britta his head back and forth and grabbed the nurses station with his hands. Staff assisted patient into a chair and took vital signs. BP-162/102, HR-56, R-18, O2 sat -95% on room air. Finger stick blood glucose-95. Pt then appeared to be having involuntary movements and began to jerk his body all over. During this time patient was alert and oriented x3 and was able to follow all commands such as sticking out his tongue, raising each extremity individually and squeezing staffs fingers. Upon staff going behind nurses station out of patient's view the jerking movements were noted to have stopped as seen by staff on dayroom monitor which was zoomed in on the patient. Dr. Vanegas notified by Lead RN Queenie and new orders noted for Clonidine 0.1mg po x1 dose. Will continue to monitor.

## 2017-06-18 PROCEDURE — 99232 SBSQ HOSP IP/OBS MODERATE 35: CPT | Performed by: PSYCHIATRY & NEUROLOGY

## 2017-06-18 RX ADMIN — LORAZEPAM 1 MG: 1 TABLET ORAL at 21:08

## 2017-06-18 RX ADMIN — TRAZODONE HYDROCHLORIDE 50 MG: 50 TABLET ORAL at 20:55

## 2017-06-18 RX ADMIN — LORAZEPAM 1 MG: 1 TABLET ORAL at 07:59

## 2017-06-18 RX ADMIN — IBUPROFEN 600 MG: 400 TABLET, FILM COATED ORAL at 15:41

## 2017-06-18 RX ADMIN — NICOTINE 1 PATCH: 21 PATCH TRANSDERMAL at 08:00

## 2017-06-18 RX ADMIN — FAMOTIDINE 20 MG: 20 TABLET, FILM COATED ORAL at 15:41

## 2017-06-18 RX ADMIN — DIVALPROEX SODIUM 500 MG: 500 TABLET, FILM COATED, EXTENDED RELEASE ORAL at 20:55

## 2017-06-18 RX ADMIN — OLANZAPINE 5 MG: 5 TABLET, ORALLY DISINTEGRATING ORAL at 17:16

## 2017-06-18 RX ADMIN — OLANZAPINE 5 MG: 5 TABLET, ORALLY DISINTEGRATING ORAL at 08:01

## 2017-06-18 RX ADMIN — IBUPROFEN 600 MG: 400 TABLET, FILM COATED ORAL at 02:07

## 2017-06-18 RX ADMIN — THIAMINE HCL (VITAMIN B1) 50 MG TABLET 100 MG: at 08:00

## 2017-06-18 RX ADMIN — HYDROXYZINE HYDROCHLORIDE 50 MG: 50 TABLET ORAL at 20:55

## 2017-06-18 RX ADMIN — LORAZEPAM 1 MG: 1 TABLET ORAL at 14:35

## 2017-06-18 RX ADMIN — Medication 1 TABLET: at 08:00

## 2017-06-18 NOTE — PROGRESS NOTES
"Behavioral Health Treatment Plan and Problem List: I have reviewed and approved the Behavioral Health Treatment Plan and Problem list.     LOS: 6 days     Allergies  No Known Allergies    Assessment completed within view of staff    Chief Complaint:  Bipolar and delirium    Subjective     Interval History: The patient states that he is feeling better and his confusion has gotten much better. He was started on Ativan detox day 2 yesterday and it seems to be helping.    Review of Systems:   General ROS: negative for - fever or malaise  Endocrine ROS: negative for - palpitations  Respiratory ROS: no cough, shortness of breath, or wheezing  Cardiovascular ROS: no chest pain or dyspnea on exertion  Gastrointestinal ROS: no abdominal pain,no black or bloody stools    Objective     Vital Signs  /77 (BP Location: Right arm, Patient Position: Standing)  Pulse 72  Temp 96.8 °F (36 °C) (Temporal Artery )   Resp 18  Ht 70\" (177.8 cm)  Wt 199 lb 9.6 oz (90.5 kg)  SpO2 97%  BMI 28.64 kg/m2    Mental Status Exam:   Mood/Affect: sad/depressed  Thought Processes:  linear, logical, and goal directed  Thought Content:  normal  Hallucination(s): no  Hopelessness: yes  Suicidal Thoughts:  none  Suicidal Plan/Intent: none  Homicidal Thoughts:  absent     Results Review:    Lab Results (last 24 hours)     ** No results found for the last 24 hours. **        Imaging Results (last 24 hours)     ** No results found for the last 24 hours. **          Medication Review:   Scheduled Medications:    divalproex 500 mg Oral Nightly   LORazepam 1 mg Oral 3 times per day   Followed by      [START ON 6/19/2017] LORazepam 0.5 mg Oral 3 times per day   multivitamin 1 tablet Oral Daily   nicotine 1 patch Transdermal Daily   OLANZapine zydis 5 mg Oral BID   vitamin B-1 100 mg Oral Daily        PRN Medications:  •  aluminum-magnesium hydroxide-simethicone  •  benzonatate  •  benztropine **OR** benztropine  •  famotidine  •  hydrOXYzine  •  " ibuprofen  •  loperamide  •  [] LORazepam **FOLLOWED BY** LORazepam **FOLLOWED BY** [START ON 2017] LORazepam  •  magnesium hydroxide  •  OLANZapine zydis  •  ondansetron  •  pneumococcal polysaccharide 23-valent  •  sodium chloride  •  traZODone   All medications reviewed.      Assessment/Plan   Patient Active Problem List   Diagnosis Code   • Delirium R41.0   • Opioid dependence with withdrawal F11.23   • Bipolar I, most recent episode mixed, severe with psychotic behavior F31.64   • Benzodiazepine dependence F13.20   • Cannabis abuse F12.10     Plan:  - Continue treatment.        London Vanegas MD  17  1:36 PM

## 2017-06-18 NOTE — PLAN OF CARE
"Problem:  Patient Care Overview (Adult)  Goal: Plan of Care Review  Outcome: Ongoing (interventions implemented as appropriate)  Patient continues to have periods of confusion this shift, reports anxiety and depression both at 9 on 0-10 scale. Patient reports VH stating \"I see competitions in my dreams.\" Patient denies SI and HI. Sleep is good and appetite is fair. Will continue to monitor.    06/18/17 1525   Coping/Psychosocial Response Interventions   Plan Of Care Reviewed With patient   Coping/Psychosocial   Patient Agreement with Plan of Care agrees   Patient Care Overview   Progress improving       Goal: Interdisciplinary Rounds/Family Conference  Outcome: Ongoing (interventions implemented as appropriate)  Goal: Individualization and Mutuality  Outcome: Ongoing (interventions implemented as appropriate)  Goal: Discharge Needs Assessment  Outcome: Ongoing (interventions implemented as appropriate)    Problem:  Overarching Goals  Goal: Adheres to Safety Considerations for Self and Others  Outcome: Ongoing (interventions implemented as appropriate)  Goal: Optimized Coping Skills in Response to Life Stressors  Outcome: Ongoing (interventions implemented as appropriate)  Goal: Develops/Participates in Therapeutic [place] to Support Successful Transition  Outcome: Ongoing (interventions implemented as appropriate)      "

## 2017-06-18 NOTE — PLAN OF CARE
Problem: BH Patient Care Overview (Adult)  Goal: Plan of Care Review  Outcome: Ongoing (interventions implemented as appropriate)    06/18/17 0314   Coping/Psychosocial Response Interventions   Plan Of Care Reviewed With patient   Coping/Psychosocial   Patient Agreement with Plan of Care agrees   Outcome Evaluation   Outcome Summary/Follow up Plan Pt. was up at 2AM with complaints of back pain and having trouble going back to sleep.    Patient Care Overview   Progress improving         Problem:  Overarching Goals  Goal: Adheres to Safety Considerations for Self and Others  Outcome: Ongoing (interventions implemented as appropriate)  Goal: Optimized Coping Skills in Response to Life Stressors  Outcome: Ongoing (interventions implemented as appropriate)  Goal: Develops/Participates in Therapeutic [place] to Support Successful Transition  Outcome: Ongoing (interventions implemented as appropriate)

## 2017-06-19 PROCEDURE — 99232 SBSQ HOSP IP/OBS MODERATE 35: CPT | Performed by: PSYCHIATRY & NEUROLOGY

## 2017-06-19 RX ADMIN — LORAZEPAM 0.5 MG: 0.5 TABLET ORAL at 08:14

## 2017-06-19 RX ADMIN — DIVALPROEX SODIUM 500 MG: 500 TABLET, FILM COATED, EXTENDED RELEASE ORAL at 21:06

## 2017-06-19 RX ADMIN — THIAMINE HCL (VITAMIN B1) 50 MG TABLET 100 MG: at 08:14

## 2017-06-19 RX ADMIN — LORAZEPAM 0.5 MG: 0.5 TABLET ORAL at 21:06

## 2017-06-19 RX ADMIN — OLANZAPINE 5 MG: 5 TABLET, ORALLY DISINTEGRATING ORAL at 16:58

## 2017-06-19 RX ADMIN — IBUPROFEN 600 MG: 400 TABLET, FILM COATED ORAL at 18:10

## 2017-06-19 RX ADMIN — LORAZEPAM 0.5 MG: 0.5 TABLET ORAL at 14:09

## 2017-06-19 RX ADMIN — NICOTINE 1 PATCH: 21 PATCH TRANSDERMAL at 08:19

## 2017-06-19 RX ADMIN — FAMOTIDINE 20 MG: 20 TABLET, FILM COATED ORAL at 08:14

## 2017-06-19 RX ADMIN — TRAZODONE HYDROCHLORIDE 50 MG: 50 TABLET ORAL at 21:06

## 2017-06-19 RX ADMIN — OLANZAPINE 5 MG: 5 TABLET, ORALLY DISINTEGRATING ORAL at 08:14

## 2017-06-19 RX ADMIN — IBUPROFEN 600 MG: 400 TABLET, FILM COATED ORAL at 08:14

## 2017-06-19 RX ADMIN — Medication 1 TABLET: at 08:14

## 2017-06-19 NOTE — PLAN OF CARE
Problem: BH Patient Care Overview (Adult)  Goal: Plan of Care Review  Outcome: Ongoing (interventions implemented as appropriate)    06/19/17 0502   Coping/Psychosocial Response Interventions   Plan Of Care Reviewed With patient   Coping/Psychosocial   Patient Agreement with Plan of Care agrees   Outcome Evaluation   Outcome Summary/Follow up Plan Patient didn't sleep well. Rates anxiety and depression both a 8/10. Denies SI/HI.    Patient Care Overview   Progress no change         Problem:  Overarching Goals  Goal: Adheres to Safety Considerations for Self and Others  Outcome: Ongoing (interventions implemented as appropriate)  Goal: Optimized Coping Skills in Response to Life Stressors  Outcome: Ongoing (interventions implemented as appropriate)  Goal: Develops/Participates in Therapeutic [place] to Support Successful Transition  Outcome: Ongoing (interventions implemented as appropriate)

## 2017-06-19 NOTE — PLAN OF CARE
Problem: BH Patient Care Overview (Adult)  Goal: Plan of Care Review  Outcome: Ongoing (interventions implemented as appropriate)    06/19/17 9141   Coping/Psychosocial Response Interventions   Plan Of Care Reviewed With patient   Coping/Psychosocial   Patient Agreement with Plan of Care agrees   Outcome Evaluation   Outcome Summary/Follow up Plan Patient cooperative. Anxiety 6/10, Depression 8/10.    Patient Care Overview   Progress no change

## 2017-06-19 NOTE — PROGRESS NOTES
Therapist contacted [facility] in [place].  Spoke with the patient's wife she stated that she had been in touch with them prior to the patient coming to the hospital and they were agreeable for patient to come there.  Navigator was told last week that due to the patient's hallucinations they were not able to meet his needs.  I contacted them again today and discussed with him the patient's current status.  I explained to them that the patient had been experiencing delirium due to Xanax withdrawal.  They requested updated notes on this day.  Also the patient completed a phone screening for [employer].    Patient's wife has noted that the patient must go to a treatment facility from the hospital.

## 2017-06-19 NOTE — PLAN OF CARE
Problem:  Patient Care Overview (Adult)  Goal: Interdisciplinary Rounds/Family Conference  Outcome: Ongoing (interventions implemented as appropriate)    06/19/17 0845   Interdisciplinary Rounds/Family Conf   Summary Individual session   Participants social work      D:Upon entering unit this a.m. patient was in hallway and assumed therapist was still his assigned therapist.  He asked therapist to meet with him.  Due to no assignment sheet yet distributed therapist met with him individually.  Expressed he felt better overall and attempted to get along well with peers on the unit even once he had some conflict with.  He was somewhat focused on attempting to discharge and entering long-term treatment.  Therapist explained that we would have to submit more information to [facility] to see if they would be agreeable to accept him.  He explained that he been started on Ativan detox and felt today was a last day and it was explained that he would need to be complete with that detox before discharge to go to long-term treatment.  He appeared a bit frustrated with that news but reported he would do what it took to get him stable enough to get treatment where he has wife preferred.     Therapist spoke to discharge navigator and explained the situation and she will contact them today to provide updated doctor's notes and discuss possible admission.     A: Patient mood and affect appear appropriate.  Patient denied SI/HI/AVH.     P; patient remains hospitalized for safety and stabilization and continues to work to complete detox protocol.  Assigned therapist will work with patient and treatment team to establish appropriate disposition plan.

## 2017-06-19 NOTE — PROGRESS NOTES
Navigator is helping primary therapist with discharge planning. Navigator faxed Therapist and Doctors progress notes to [facility] on this day.

## 2017-06-19 NOTE — PROGRESS NOTES
"7    ID:Maurice Puga is a 46 y.o. male    CC: \"Doing okay today\"     HPI: The patient is oriented ×4 today.  He denies any suicidal thoughts or homicidal thoughts.  Denies any perceptual disturbances.  He still anxious and somewhat depressed.  He is more aware of his surroundings and that other patients are floridly psychotic.  This has made him more anxious and also somewhat anxious about going on to a 30 day program for fear that he will be with other people who are having psychosis.  I reassured him that in a 30 day program they would not have floridly psychotic people in the program.  The patient denies any other physical problems today.    I reviewed the patient's chart including notes from Dr. King from Friday where he spoke to his wife for some time.  According to her report, the patient has not had a manic episode in the past, but rather symptoms seem to coincide when he ran out of benzodiazepines.      Sleep: fair      Review of Systems   Cardiovascular: Negative.    Gastrointestinal: Negative.    Musculoskeletal: Positive for back pain.   Neurological: Negative.        Temp:  [97.6 °F (36.4 °C)] 97.6 °F (36.4 °C)  Heart Rate:  [] 97  Resp:  [18] 18  BP: (133-151)/(89-98) 133/89    MENTAL STATUS EXAM:  Appearance:Neatly, casually dressed, good hygeine.   Cooperation:Cooperative  Orientation: Ox4  Gait and station stable.   Psychomotor: No psychomotor agitation/retardation, No EPS, No motor tics  Speech- low volume, normal amount  Mood \"anxious\"   Affect-constricted  Thought Content- goal-directed  Thought process- still slightly circumstantial   Suicidality: No SI  Homicidality: No HI  Perception: No AH/VH  Memory is intact for recent and remote events  Concentration: poor  Impulse control-good  Insight- improving   Judgement- improving    Lab Results (last 24 hours)     ** No results found for the last 24 hours. **          ALLERGIES: Review of patient's allergies indicates no known " allergies.      Current Facility-Administered Medications:   •  aluminum-magnesium hydroxide-simethicone (MAALOX MAX) 400-400-40 MG/5ML suspension 15 mL, 15 mL, Oral, Q6H PRN, Sean Ku MD, 15 mL at 17 0555  •  benzonatate (TESSALON) capsule 100 mg, 100 mg, Oral, TID PRN, Sean Ku MD  •  benztropine (COGENTIN) tablet 1 mg, 1 mg, Oral, Daily PRN **OR** benztropine (COGENTIN) injection 0.5 mg, 0.5 mg, Intramuscular, Daily PRN, Sean Ku MD  •  divalproex (DEPAKOTE) 24 hr tablet 500 mg, 500 mg, Oral, Nightly, Ankush Caro MD, 500 mg at 17  •  famotidine (PEPCID) tablet 20 mg, 20 mg, Oral, BID PRN, Sean Ku MD, 20 mg at 17  •  hydrOXYzine (ATARAX) tablet 50 mg, 50 mg, Oral, Q6H PRN, Sean Ku MD, 50 mg at 17  •  ibuprofen (ADVIL,MOTRIN) tablet 600 mg, 600 mg, Oral, Q6H PRN, Sean Ku MD, 600 mg at 17  •  loperamide (IMODIUM) capsule 2 mg, 2 mg, Oral, 4x Daily PRN, Sean Ku MD  •  [COMPLETED] LORazepam (ATIVAN) tablet 1.5 mg, 1.5 mg, Oral, 3 times per day, 1.5 mg at 17 **FOLLOWED BY** [COMPLETED] LORazepam (ATIVAN) tablet 1 mg, 1 mg, Oral, 3 times per day, 1 mg at 17 **FOLLOWED BY** LORazepam (ATIVAN) tablet 0.5 mg, 0.5 mg, Oral, 3 times per day, London Vanegas MD, 0.5 mg at 17 1409  •  [] LORazepam (ATIVAN) tablet 1.5 mg, 1.5 mg, Oral, Q4H PRN **FOLLOWED BY** [] LORazepam (ATIVAN) tablet 1 mg, 1 mg, Oral, Q4H PRN **FOLLOWED BY** LORazepam (ATIVAN) tablet 0.5 mg, 0.5 mg, Oral, Q4H PRN, London Vanegas MD  •  magnesium hydroxide (MILK OF MAGNESIA) suspension 2400 mg/10mL 10 mL, 10 mL, Oral, Daily PRN, Sean Ku MD  •  multivitamin (DAILY VICENTE) tablet 1 tablet, 1 tablet, Oral, Daily, Ranjan King MD, 1 tablet at 17 0814  •  nicotine (NICODERM CQ) 21 MG/24HR patch 1 patch, 1 patch, Transdermal, Daily, Sean  Cici Ku MD, 1 patch at 06/19/17 0819  •  OLANZapine zydis (zyPREXA) disintegrating tablet 5 mg, 5 mg, Oral, BID, Ranjan King MD, 5 mg at 06/19/17 0814  •  OLANZapine zydis (zyPREXA) disintegrating tablet 5 mg, 5 mg, Oral, Q48H PRN, Ranjan King MD  •  ondansetron (ZOFRAN) tablet 4 mg, 4 mg, Oral, Q6H PRN, Sean Ku MD  •  pneumococcal polysaccharide 23-valent (PNEUMOVAX-23) vaccine 0.5 mL, 0.5 mL, Intramuscular, During Hospitalization, Sean Ku MD  •  sodium chloride (OCEAN) nasal spray 2 spray, 2 spray, Each Nare, PRN, Sean Ku MD  •  traZODone (DESYREL) tablet 50 mg, 50 mg, Oral, Nightly PRN, Sean Ku MD, 50 mg at 06/18/17 2055  •  vitamin B-1 tablet 100 mg, 100 mg, Oral, Daily, Ranjan King MD, 100 mg at 06/19/17 0814      SAFETY PRECAUTIONS: SP LEVEL III    ASSESSMENT/PLAN:  Patient Active Problem List   Diagnosis   • Benzodiazepine withdrawal with delirium   • Opioid dependence with withdrawal   • MDD (major depressive disorder), recurrent, severe, with psychosis   • Benzodiazepine dependence   • Cannabis abuse         Plan: Continue hospitalization for safety and stabilization.Continue Depakote at current dose.  Finish Ativan taper tonight plan for possible discharge tomorrow to a 30 day program or soon as a bed is available.  We may need to consider crisis stabilization stepdown until he has a bed available.        I have reviewed the treatment plan and agree with current plan.  Treatment was discussed with the patient who is agreeable to this treatment and plan.

## 2017-06-20 PROCEDURE — 99231 SBSQ HOSP IP/OBS SF/LOW 25: CPT | Performed by: PSYCHIATRY & NEUROLOGY

## 2017-06-20 RX ORDER — TRAZODONE HYDROCHLORIDE 50 MG/1
100 TABLET ORAL NIGHTLY PRN
Status: DISCONTINUED | OUTPATIENT
Start: 2017-06-20 | End: 2017-06-21 | Stop reason: HOSPADM

## 2017-06-20 RX ADMIN — OLANZAPINE 5 MG: 5 TABLET, ORALLY DISINTEGRATING ORAL at 08:20

## 2017-06-20 RX ADMIN — IBUPROFEN 600 MG: 400 TABLET, FILM COATED ORAL at 02:41

## 2017-06-20 RX ADMIN — IBUPROFEN 600 MG: 400 TABLET, FILM COATED ORAL at 11:16

## 2017-06-20 RX ADMIN — TRAZODONE HYDROCHLORIDE 100 MG: 50 TABLET ORAL at 20:44

## 2017-06-20 RX ADMIN — IBUPROFEN 600 MG: 400 TABLET, FILM COATED ORAL at 20:44

## 2017-06-20 RX ADMIN — THIAMINE HCL (VITAMIN B1) 50 MG TABLET 100 MG: at 08:19

## 2017-06-20 RX ADMIN — ONDANSETRON 4 MG: 4 TABLET, FILM COATED ORAL at 02:42

## 2017-06-20 RX ADMIN — HYDROXYZINE HYDROCHLORIDE 50 MG: 50 TABLET ORAL at 16:47

## 2017-06-20 RX ADMIN — Medication 1 TABLET: at 08:19

## 2017-06-20 RX ADMIN — DIVALPROEX SODIUM 500 MG: 500 TABLET, FILM COATED, EXTENDED RELEASE ORAL at 20:44

## 2017-06-20 RX ADMIN — NICOTINE 1 PATCH: 21 PATCH TRANSDERMAL at 08:20

## 2017-06-20 NOTE — PLAN OF CARE
Problem: BH Patient Care Overview (Adult)  Goal: Plan of Care Review  Outcome: Ongoing (interventions implemented as appropriate)    06/20/17 5693   Coping/Psychosocial Response Interventions   Plan Of Care Reviewed With patient   Coping/Psychosocial   Patient Agreement with Plan of Care agrees   Outcome Evaluation   Outcome Summary/Follow up Plan patient calm and cooperative.    Patient Care Overview   Progress no change

## 2017-06-20 NOTE — DISCHARGE INSTR - APPOINTMENTS
[place] [id number] [facility] [address]    [phone]      Patient is to come to the facility at discharge from our facility. Patient's wife will transport. Please contact the facility on day of discharge so that the facility can be expecting him.

## 2017-06-20 NOTE — PROGRESS NOTES
"8    ID:Maurice Puga is a 46 y.o. male    CC: \"Doing okay today\"     HPI: The patient reports doing okay today.  He denies any auditory or visual hallucinations.  His concentration is still somewhat impaired and also was confused about the day of the week.  Otherwise he was oriented to time, person, place, and situation.  He continues to exhibit frustration about being in a psychiatric facility.  He denies any suicidal or homicidal thoughts no auditory or visual hallucinations.    Per therapist, the pt has a bed at a rehabilitation center when he is ready for discharge.  His wife also need notice to provide transportation for him.      Sleep: fair      Review of Systems   Cardiovascular: Negative.    Gastrointestinal: Negative.    Musculoskeletal: Positive for back pain.   Neurological: Negative.        Temp:  [96.7 °F (35.9 °C)-97.5 °F (36.4 °C)] 97.5 °F (36.4 °C)  Heart Rate:  [70-97] 97  Resp:  [18] 18  BP: (139-148)/() 141/96    MENTAL STATUS EXAM:  Appearance:Neatly, casually dressed, good hygeine.   Cooperation:Cooperative  Orientation: Ox4  Gait and station stable.   Psychomotor: No psychomotor agitation/retardation, No EPS, No motor tics  Speech- low volume, normal amount  Mood \"anxious\"   Affect-constricted  Thought Content- goal-directed  Thought process- still slightly circumstantial   Suicidality: No SI  Homicidality: No HI  Perception: No AH/VH  Memory is intact for recent and remote events  Concentration: poor  Impulse control-good  Insight- improving   Judgement- improving    Lab Results (last 24 hours)     ** No results found for the last 24 hours. **          ALLERGIES: Review of patient's allergies indicates no known allergies.      Current Facility-Administered Medications:   •  aluminum-magnesium hydroxide-simethicone (MAALOX MAX) 400-400-40 MG/5ML suspension 15 mL, 15 mL, Oral, Q6H PRN, Sean Ku MD, 15 mL at 06/14/17 0555  •  benzonatate (TESSALON) capsule 100 mg, 100 mg, " Oral, TID PRN, Sean Ku MD  •  benztropine (COGENTIN) tablet 1 mg, 1 mg, Oral, Daily PRN **OR** benztropine (COGENTIN) injection 0.5 mg, 0.5 mg, Intramuscular, Daily PRN, Sean Ku MD  •  divalproex (DEPAKOTE) 24 hr tablet 500 mg, 500 mg, Oral, Nightly, Ankush Caro MD, 500 mg at 06/19/17 2106  •  famotidine (PEPCID) tablet 20 mg, 20 mg, Oral, BID PRN, Sean Ku MD, 20 mg at 06/19/17 0814  •  hydrOXYzine (ATARAX) tablet 50 mg, 50 mg, Oral, Q6H PRN, Sean Ku MD, 50 mg at 06/18/17 2055  •  ibuprofen (ADVIL,MOTRIN) tablet 600 mg, 600 mg, Oral, Q6H PRN, Sean Ku MD, 600 mg at 06/20/17 1116  •  loperamide (IMODIUM) capsule 2 mg, 2 mg, Oral, 4x Daily PRN, Sean Ku MD  •  magnesium hydroxide (MILK OF MAGNESIA) suspension 2400 mg/10mL 10 mL, 10 mL, Oral, Daily PRN, Sean Ku MD  •  multivitamin (DAILY VICENTE) tablet 1 tablet, 1 tablet, Oral, Daily, Ranjan King MD, 1 tablet at 06/20/17 0819  •  nicotine (NICODERM CQ) 21 MG/24HR patch 1 patch, 1 patch, Transdermal, Daily, Sean Ku MD, 1 patch at 06/20/17 0820  •  OLANZapine zydis (zyPREXA) disintegrating tablet 5 mg, 5 mg, Oral, Q48H PRN, Ranjan King MD  •  ondansetron (ZOFRAN) tablet 4 mg, 4 mg, Oral, Q6H PRN, Sean Ku MD, 4 mg at 06/20/17 0242  •  pneumococcal polysaccharide 23-valent (PNEUMOVAX-23) vaccine 0.5 mL, 0.5 mL, Intramuscular, During Hospitalization, Sean Ku MD  •  sodium chloride (OCEAN) nasal spray 2 spray, 2 spray, Each Nare, PRN, Sean Ku MD  •  traZODone (DESYREL) tablet 100 mg, 100 mg, Oral, Nightly PRN, Ankush Caro MD  •  vitamin B-1 tablet 100 mg, 100 mg, Oral, Daily, Ranjan King MD, 100 mg at 06/20/17 0819      SAFETY PRECAUTIONS: SP LEVEL III    ASSESSMENT/PLAN:  Patient Active Problem List   Diagnosis   • Benzodiazepine withdrawal with delirium   • Opioid  dependence with withdrawal   • MDD (major depressive disorder), recurrent, severe, with psychosis   • Benzodiazepine dependence   • Cannabis abuse         Plan: Continue hospitalization for safety and stabilization.Continue Depakote at current dose.  Stop zyprexa. Plan for d/c to rehab tomorrow    I have reviewed the treatment plan and agree with current plan.  Treatment was discussed with the patient who is agreeable to this treatment and plan.       This provider is located at [facility], Behavioral health, [address], [address] [facility] [address] in [facility].  The patient is seen remotely at [facility], [address] [place] [address], using [employer], an encrypted service from one [facility] to another, with staff present. The patient's condition being diagnosed/treated is appropriate for telemedecine.  The provider identified himself and his credentials.     The patient and/or patient's guardian consent to be seen remotely, and when consent is given they understand that the consent allows for patient identifiable information to be sent to a third party as needed.  They may refuse to be seen remotely at any time.  The electronic data is encrypted and password protected, and the patient has been advised of the potential risks to privacy notwithstanding such measures.

## 2017-06-20 NOTE — PROGRESS NOTES
Data: Contacted [facility] in [place] today to confirm patient can go there at discharge.  The facility stated that the patient can come there upon discharge from the hospital but they would like to have an update on when the patient is coming.  I contacted the facility to let them know that the patient is planned to discharge tomorrow.   They suggested that the patient's wife contact them about what to bring and what not to bring.  She stated that she will contact them today. She will plan to come visit the patient tonight. We will try to have the patient discharge early in the day tomorrow so that the patient can get to the facility at an early time.    Assessment: patient appears to be at his baseline and is ready to discharge to [facility].     Plan: Patient will plan to discharge to [facility] tomorrow.

## 2017-06-21 VITALS
BODY MASS INDEX: 28.58 KG/M2 | RESPIRATION RATE: 18 BRPM | TEMPERATURE: 97.6 F | HEART RATE: 77 BPM | WEIGHT: 199.6 LBS | OXYGEN SATURATION: 100 % | SYSTOLIC BLOOD PRESSURE: 162 MMHG | DIASTOLIC BLOOD PRESSURE: 106 MMHG | HEIGHT: 70 IN

## 2017-06-21 PROCEDURE — 90732 PPSV23 VACC 2 YRS+ SUBQ/IM: CPT | Performed by: PSYCHIATRY & NEUROLOGY

## 2017-06-21 PROCEDURE — 25010000002 PNEUMOCOCCAL VAC POLYVALENT PER 0.5 ML: Performed by: PSYCHIATRY & NEUROLOGY

## 2017-06-21 PROCEDURE — G0009 ADMIN PNEUMOCOCCAL VACCINE: HCPCS | Performed by: PSYCHIATRY & NEUROLOGY

## 2017-06-21 RX ORDER — DIVALPROEX SODIUM 500 MG/1
500 TABLET, EXTENDED RELEASE ORAL NIGHTLY
Qty: 30 TABLET | Refills: 0 | Status: SHIPPED | OUTPATIENT
Start: 2017-06-21 | End: 2019-06-25

## 2017-06-21 RX ORDER — TRAZODONE HYDROCHLORIDE 150 MG/1
150 TABLET ORAL NIGHTLY PRN
Qty: 30 TABLET | Refills: 0 | Status: SHIPPED | OUTPATIENT
Start: 2017-06-21 | End: 2019-06-25

## 2017-06-21 RX ADMIN — CLONIDINE 1 PATCH: 0.1 PATCH, EXTENDED RELEASE TRANSDERMAL at 12:03

## 2017-06-21 RX ADMIN — NICOTINE 1 PATCH: 21 PATCH TRANSDERMAL at 08:03

## 2017-06-21 RX ADMIN — FAMOTIDINE 20 MG: 20 TABLET, FILM COATED ORAL at 02:04

## 2017-06-21 RX ADMIN — THIAMINE HCL (VITAMIN B1) 50 MG TABLET 100 MG: at 08:04

## 2017-06-21 RX ADMIN — IBUPROFEN 600 MG: 400 TABLET, FILM COATED ORAL at 02:04

## 2017-06-21 RX ADMIN — PNEUMOCOCCAL VACCINE POLYVALENT 0.5 ML
25; 25; 25; 25; 25; 25; 25; 25; 25; 25; 25; 25; 25; 25; 25; 25; 25; 25; 25; 25; 25; 25; 25 INJECTION, SOLUTION INTRAMUSCULAR; SUBCUTANEOUS at 11:36

## 2017-06-21 RX ADMIN — HYDROXYZINE HYDROCHLORIDE 50 MG: 50 TABLET ORAL at 02:04

## 2017-06-21 RX ADMIN — Medication 1 TABLET: at 08:04

## 2017-06-21 NOTE — PLAN OF CARE
Problem: BH Patient Care Overview (Adult)  Goal: Plan of Care Review  Outcome: Ongoing (interventions implemented as appropriate)    06/21/17 0432   Coping/Psychosocial Response Interventions   Plan Of Care Reviewed With patient   Coping/Psychosocial   Patient Agreement with Plan of Care agrees   Outcome Evaluation   Outcome Summary/Follow up Plan Pt cooperative, out in dayroom at beginning of shift. Denies anxiety and rates depression 6/10. Denies SI/HI and DELMER. Pt awake multiple times through the night.    Patient Care Overview   Progress no change

## 2017-06-21 NOTE — DISCHARGE SUMMARY
DISCHARGE SUMMARY    Date of Discharge:  6/21/2017    Discharge Diagnosis:Active Problems:    Benzodiazepine withdrawal with delirium    Opioid dependence with withdrawal    MDD (major depressive disorder), recurrent, severe, with psychosis    Benzodiazepine dependence    Cannabis abuse        Presenting Problem/History of Present Illness  Psychosis [F29]     Hospital Course  Patient is a 46 y.o. male presented with confusion and disorganized thought with some suicidal ideation.  The patient was initially quite disorganized in his thought process and started on Zyprexa 5 mg twice a day.  After further discussion with his wife, the patient had also been using benzodiazepines and had left a detox early.  After running out of benzos, he appeared confused according to his wife.  He was treated for benzodiazepine withdrawal delirium and completed an Ativan taper.  He was also started on Depakote to help with mood and withdrawal symptoms.  He slowly became more oriented to the situation and his thought process became more organized.  He was also taken off of the Zyprexa.  He was monitored for over 24 hours after his last dose and still maintained a clear sensorium.  The patient reported his mood had improved though was still feeling guilty about some of his behaviors leading up to hospitalization.  He reported resolution of suicidal thoughts.  On day of discharge, the patient reported his mood was good and his affect appeared slightly constricted but euthymic, thought content was goal directed and thought process was linear, he denied suicidal or homicidal thoughts and denied any perceptual disturbances.  During the hospitalization, the patient also found trazodone helpful for sleep which was continued as well for discharge.    Procedures Performed  CT of the head was unremarkable       Consults:   Consults     No orders found from 5/14/2017 to 6/13/2017.          Pertinent Test Results: CMP, CBC, UA were unremarkable.   THC was positive on urine drug screen.    Condition on Discharge:  stable    Vital Signs  Temp:  [98.4 °F (36.9 °C)-98.6 °F (37 °C)] 98.4 °F (36.9 °C)  Heart Rate:  [73-92] 73  Resp:  [18] 18  BP: (144-157)/() 145/86      Discharge Disposition  Home or Self Care    Discharge Medications   Maurice Puga   Home Medication Instructions ANIBAL:[id number]    Printed on:06/21/17 0943   Medication Information                      divalproex (DEPAKOTE) 500 MG 24 hr tablet  Take 1 tablet by mouth Every Night.             traZODone (DESYREL) 150 MG tablet  Take 1 tablet by mouth At Night As Needed for Sleep.                 Discharge Diet: regular    Activity at Discharge: as tolerated    Follow-up Appointments   [facility]    Test Results Pending at Discharge       Ankush Caro MD  06/21/17  9:43 AM

## 2019-06-25 ENCOUNTER — OFFICE VISIT (OUTPATIENT)
Dept: UROLOGY | Facility: CLINIC | Age: 49
End: 2019-06-25

## 2019-06-25 VITALS
TEMPERATURE: 97.6 F | HEIGHT: 71 IN | SYSTOLIC BLOOD PRESSURE: 120 MMHG | DIASTOLIC BLOOD PRESSURE: 68 MMHG | BODY MASS INDEX: 26.65 KG/M2 | WEIGHT: 190.4 LBS

## 2019-06-25 DIAGNOSIS — N45.1 EPIDIDYMITIS: Primary | ICD-10-CM

## 2019-06-25 DIAGNOSIS — N52.2 DRUG-INDUCED ERECTILE DYSFUNCTION: ICD-10-CM

## 2019-06-25 PROCEDURE — 99203 OFFICE O/P NEW LOW 30 MIN: CPT | Performed by: UROLOGY

## 2019-06-25 RX ORDER — DOXYCYCLINE HYCLATE 100 MG/1
CAPSULE ORAL
Refills: 0 | COMMUNITY
Start: 2019-06-21 | End: 2021-11-08

## 2019-06-25 RX ORDER — IBUPROFEN 800 MG/1
TABLET ORAL
Refills: 3 | COMMUNITY
Start: 2019-06-21

## 2019-06-25 RX ORDER — CLONAZEPAM 1 MG/1
TABLET ORAL
Refills: 1 | COMMUNITY
Start: 2019-05-30 | End: 2021-11-08

## 2019-06-25 RX ORDER — BUPRENORPHINE HYDROCHLORIDE AND NALOXONE HYDROCHLORIDE DIHYDRATE 8; 2 MG/1; MG/1
1 TABLET SUBLINGUAL DAILY
COMMUNITY

## 2019-06-25 RX ORDER — VENLAFAXINE HYDROCHLORIDE 37.5 MG/1
CAPSULE, EXTENDED RELEASE ORAL
Refills: 0 | COMMUNITY
Start: 2019-06-03

## 2019-06-25 RX ORDER — TESTOSTERONE CYPIONATE 200 MG/ML
INJECTION, SOLUTION INTRAMUSCULAR
Refills: 0 | COMMUNITY
Start: 2019-05-28 | End: 2022-03-10 | Stop reason: DRUGHIGH

## 2019-06-25 NOTE — PROGRESS NOTES
Chief Complaint:          Chief Complaint   Patient presents with   • Testicular pain and swelling       HPI:   48 y.o. male with 2 episodes in the last 9 years of testicular pain.  9 years ago he developed epididymitis he had treated with antibiotics he is a  and had episode of very heavy lifting he then noticed the onset of severe testicular pain he went to the [facility] had an ultrasound was told is not cancer he does not bother him on the most times but now is causing a lot of problems.  He also has difficulty ejaculating.  He is on both Klonopin and Suboxone.  No sexually transmitted disease history no evidence of exposure or any other medical problems.      Past Medical History:        Past Medical History:   Diagnosis Date   • Anxiety    • Chronic pain disorder     back pain   • Depression    • Substance abuse (CMS/[facility])          Current Meds:     Current Outpatient Medications   Medication Sig Dispense Refill   • buprenorphine-naloxone (SUBOXONE) 8-2 MG per SL tablet Place 1 tablet under the tongue Daily.     • clonazePAM (KlonoPIN) 1 MG tablet TK 1 T PO TID PRN  1   • doxycycline (VIBRAMYCIN) 100 MG capsule TK ONE C PO BID  0   • ibuprofen (ADVIL,MOTRIN) 800 MG tablet TK 1 T PO  TID PRN  3   • Testosterone Cypionate (DEPOTESTOTERONE CYPIONATE) 200 MG/ML injection INJECT 1ML IM ONCE A MONTH  0   • venlafaxine XR (EFFEXOR-XR) 37.5 MG 24 hr capsule TK ONE C PO  D  0     No current facility-administered medications for this visit.         Allergies:      No Known Allergies     Past Surgical History:     Past Surgical History:   Procedure Laterality Date   • BACK SURGERY           Social History:     Social History     Socioeconomic History   • Marital status:      Spouse name: Not on file   • Number of children: Not on file   • Years of education: Not on file   • Highest education level: Not on file   Tobacco Use   • Smoking status: Current Every Day Smoker     Packs/day: 3.00      Years: 25.00     Pack years: 75.00     Types: Cigarettes   • Smokeless tobacco: Current User   Substance and Sexual Activity   • Alcohol use: Yes     Comment: occasional   • Drug use: Yes     Types: Benzodiazepines, Marijuana, Opium     Comment: methadone   • Sexual activity: Defer       Family History:     Family History   Problem Relation Age of Onset   • Depression Mother        Review of Systems:     Review of Systems   Constitutional: Negative.    HENT: Negative.    Eyes: Negative.    Respiratory: Negative.    Cardiovascular: Negative.    Gastrointestinal: Negative.    Endocrine: Negative.    Genitourinary: Positive for testicular pain.   Musculoskeletal: Negative.    Allergic/Immunologic: Negative.    Neurological: Negative.    Hematological: Negative.    Psychiatric/Behavioral: Negative.        Physical Exam:     Physical Exam   Constitutional: He is oriented to person, place, and time. He appears well-developed and well-nourished.   HENT:   Head: Normocephalic and atraumatic.   Eyes: Conjunctivae and EOM are normal. Pupils are equal, round, and reactive to light.   Neck: Normal range of motion.   Cardiovascular: Normal rate, regular rhythm, normal heart sounds and intact distal pulses.   Pulmonary/Chest: Effort normal and breath sounds normal.   Abdominal: Soft. Bowel sounds are normal.   Genitourinary:   Genitourinary Comments: Normal circumcised phallus bilateral descended testes with painful epididymis posteriorly particularly on the right   Musculoskeletal: Normal range of motion.   Neurological: He is alert and oriented to person, place, and time. He has normal reflexes.   Skin: Skin is warm and dry.   Psychiatric: He has a normal mood and affect. His behavior is normal. Judgment and thought content normal.   Nursing note and vitals reviewed.      I have reviewed the following portions of the patient's history: allergies, current medications, past family history, past medical history, past social  history, past surgical history, problem list and ROS and confirm it's accurate.      Procedure:       Assessment/Plan:   Epididymitis-we discussed the etiology of epididymitis from lifting heavy objects to the full spectrum of epididymoorchitis.  I discussed the staging and grading system including a stage I epididymitis meaning confined to the epididymis but in a separation between the epididymis and testicle grade 2 being a concretion of both layers were I can feel the difference and finally grade 3 with scrotal fixation of the skin we discussed the recommendation of warm soaks, elevation and antibiotics were indicated.  I discussed the indication for aggressive intervention such as the presence of an abscess in the presence of significant systemic symptomatology such as fevers and chills.  We also discussed the fact that the thickening and swelling may persist beyond the pain and that sometimes a prolonged course of antibiotics may be indicated finally we discussed ultimately there may be significant atrophy of the testicle after the episode of epididymitis and its important to watch closely for that he does not need antibiotics this is a consequence of some reflux down the vas from heavy lifting I am recommending elevation warm soaks etc.  Erectile dysfunction-we discussed the most likely etiology of his difficulty having ejaculation being Suboxone and probable effects on testosterone            Patient's Body mass index is 26.56 kg/m². BMI is above normal parameters. Recommendations include: educational material.              This document has been electronically signed by PEÑA BRITTON MD June 25, 2019 3:20 PM

## 2019-06-27 PROBLEM — N45.1 EPIDIDYMITIS: Status: ACTIVE | Noted: 2019-06-27

## 2019-06-27 PROBLEM — N52.2 DRUG-INDUCED ERECTILE DYSFUNCTION: Status: ACTIVE | Noted: 2019-06-27

## 2021-11-08 ENCOUNTER — OFFICE VISIT (OUTPATIENT)
Dept: UROLOGY | Facility: CLINIC | Age: 51
End: 2021-11-08

## 2021-11-08 VITALS — HEIGHT: 71 IN | WEIGHT: 207.4 LBS | BODY MASS INDEX: 29.03 KG/M2

## 2021-11-08 DIAGNOSIS — A63.0 CONDYLOMA ACUMINATA: ICD-10-CM

## 2021-11-08 DIAGNOSIS — N42.9 DISORDER OF PROSTATE: ICD-10-CM

## 2021-11-08 DIAGNOSIS — R79.89 LOW TESTOSTERONE: Primary | ICD-10-CM

## 2021-11-08 PROCEDURE — 99213 OFFICE O/P EST LOW 20 MIN: CPT | Performed by: UROLOGY

## 2021-11-08 PROCEDURE — 36415 COLL VENOUS BLD VENIPUNCTURE: CPT | Performed by: UROLOGY

## 2021-11-08 RX ORDER — BUSPIRONE HYDROCHLORIDE 10 MG/1
20 TABLET ORAL 4 TIMES DAILY
COMMUNITY
Start: 2021-08-11

## 2021-11-08 NOTE — PROGRESS NOTES
"Chief Complaint:          Chief Complaint   Patient presents with   • Genital Warts     New Patient        HPI:   50 y.o. male new patient referred for evaluation of several small condyloma on the shaft of the penis is desirous of fulguration I will arrange this form he also has concerns about low testosterone with a positive FLORENCIO-androgen deficiency in the age male questionnaire:   The patient was queried regarding the androgen deficiency in the age male questionnaire.  This is a validated questionnaire that was performed on a set of 314 [place] male physicians. When it was positive, it correlated directly with a 94% chance of low testosterone.  Patient indicates there is a decrease in libido or sex drive, a lack of energy, decreased  strength and endurance, a decreased \"enjoyment of life\", sad and grumpy feelings with significant difficulty maintaining erections. There has also been a recent deterioration regarding work performance. I will initiate a work-up      Past Medical History:        Past Medical History:   Diagnosis Date   • Anxiety    • Chronic pain disorder     back pain   • Depression    • Substance abuse (HCC)          Current Meds:     Current Outpatient Medications   Medication Sig Dispense Refill   • buprenorphine-naloxone (SUBOXONE) 8-2 MG per SL tablet Place 1 tablet under the tongue Daily.     • busPIRone (BUSPAR) 10 MG tablet Take 20 mg by mouth 4 (Four) Times a Day.     • ibuprofen (ADVIL,MOTRIN) 800 MG tablet TK 1 T PO  TID PRN  3   • Testosterone Cypionate (DEPOTESTOTERONE CYPIONATE) 200 MG/ML injection INJECT 1ML IM ONCE A MONTH  0   • venlafaxine XR (EFFEXOR-XR) 37.5 MG 24 hr capsule TK ONE C PO  D  0     No current facility-administered medications for this visit.        Allergies:      No Known Allergies     Past Surgical History:     Past Surgical History:   Procedure Laterality Date   • BACK SURGERY           Social History:     Social History     Socioeconomic History   • Marital " status:    Tobacco Use   • Smoking status: Current Every Day Smoker     Packs/day: 3.00     Years: 25.00     Pack years: 75.00     Types: Cigarettes   • Smokeless tobacco: Former User   Substance and Sexual Activity   • Alcohol use: Yes     Comment: seldom   • Drug use: No     Types: Benzodiazepines, Marijuana, Opium     Comment: methadone   • Sexual activity: Defer       Family History:     Family History   Problem Relation Age of Onset   • Depression Mother        Review of Systems:     Review of Systems   Constitutional: Negative.    HENT: Negative.    Eyes: Negative.    Respiratory: Negative.    Cardiovascular: Negative.    Gastrointestinal: Negative.    Endocrine: Negative.    Musculoskeletal: Negative.    Allergic/Immunologic: Negative.    Neurological: Negative.    Hematological: Negative.    Psychiatric/Behavioral: Negative.        Physical Exam:     Physical Exam  Vitals and nursing note reviewed.   Constitutional:       Appearance: He is well-developed.   HENT:      Head: Normocephalic and atraumatic.   Eyes:      Conjunctiva/sclera: Conjunctivae normal.      Pupils: Pupils are equal, round, and reactive to light.   Cardiovascular:      Rate and Rhythm: Normal rate and regular rhythm.      Heart sounds: Normal heart sounds.   Pulmonary:      Effort: Pulmonary effort is normal.      Breath sounds: Normal breath sounds.   Abdominal:      General: Bowel sounds are normal.      Palpations: Abdomen is soft.   Genitourinary:     Comments: Several small condyloma on the shaft of the penis dorsally  Musculoskeletal:         General: Normal range of motion.      Cervical back: Normal range of motion.   Skin:     General: Skin is warm and dry.   Neurological:      Mental Status: He is alert and oriented to person, place, and time.      Deep Tendon Reflexes: Reflexes are normal and symmetric.   Psychiatric:         Behavior: Behavior normal.         Thought Content: Thought content normal.         Judgment:  Judgment normal.         I have reviewed the following portions of the patient's history: Allergies, current medications, past family history, past medical history, past social history, past surgical history, problem list, and ROS and confirm it is accurate.      Procedure:       Assessment/Plan:   Under [place] acuminata-flat appearing for fulguration  Low Testosterone: This pleasant male patient presents today with signs and symptoms that are consistent with low testosterone. He has positive Lazaro questionnaire by history, this includes both the sexual and nonsexual side effects.  Sexual side effects include inability to achieve and maintain an erection, inability to maintain his erection, and decreased interest in sexual activity.  Nonsexual symptomatology includes fatigue, difficulty completing a job, and tiredness.  We had a discussion of the various forms of testosterone available including parenteral, topical, and the form of a patch.  We discussed the efficacy of the gels and the injections, as well as the cost and benefits analysis.  We discussed the studies and talked about heart disease and its effect on prostate cancer, both of which are negligible.  He gave verbal consent to proceed with treatment.  He understands the risks and benefits of length.  He also completed his attempts at fertility. He understands the partial effect on spermatogenesis.  PSA testing-I am recommending a PSA blood test that stands for prostate specific antigen.  I discussed the pathophysiology of PSA testing indicating its use in the diagnosis and management of prostate cancer.  I discussed the normal range being 0 to 4, but more appropriately being much closer to 0 to 2 in a normal male.  I discussed the fact that after a certain age we don't recommend PSA testing especially in view of numerous comorbidities, that this will not be a useful test.  I discussed many of the things that can artificially raise PSA including a recent  infection, urinary tract infection, and recent sexual intercourse, or even the type of movement such as manipulation of the prostate from riding a bicycle.  After all this is taken into account when the test is reviewed, the most important use of PSA is the velocity measurement.  In other words, the change of PSA with time is a very important factor in the use and that we look for greater than 20% rise over a year to help us make the prediction of prostate cancer.  I also discussed that the use with prostate cancer indicating that after a radical prostatectomy, the PSA should be 0 and any rise indicates an early biochemical recurrence.                This document has been electronically signed by PEÑA BRITTON MD November 8, 2021 13:51 EST

## 2021-11-09 LAB
ERYTHROCYTE [DISTWIDTH] IN BLOOD BY AUTOMATED COUNT: 12.9 % (ref 12.3–15.4)
ESTRADIOL SERPL-MCNC: 10.7 PG/ML (ref 7.6–42.6)
HCT VFR BLD AUTO: 47.3 % (ref 37.5–51)
HGB BLD-MCNC: 15.2 G/DL (ref 13–17.7)
MCH RBC QN AUTO: 30.6 PG (ref 26.6–33)
MCHC RBC AUTO-ENTMCNC: 32.1 G/DL (ref 31.5–35.7)
MCV RBC AUTO: 95.4 FL (ref 79–97)
PLATELET # BLD AUTO: 310 10*3/MM3 (ref 140–450)
PSA SERPL-MCNC: 0.82 NG/ML (ref 0–4)
RBC # BLD AUTO: 4.96 10*6/MM3 (ref 4.14–5.8)
TESTOST SERPL-MCNC: 145 NG/DL (ref 264–916)
WBC # BLD AUTO: 7.03 10*3/MM3 (ref 3.4–10.8)

## 2021-11-11 PROBLEM — A63.0 CONDYLOMA ACUMINATA: Status: ACTIVE | Noted: 2021-11-11

## 2021-11-11 PROBLEM — R79.89 LOW TESTOSTERONE: Status: ACTIVE | Noted: 2021-11-11

## 2021-12-02 ENCOUNTER — TELEPHONE (OUTPATIENT)
Dept: UROLOGY | Facility: CLINIC | Age: 51
End: 2021-12-02

## 2022-01-11 ENCOUNTER — PROCEDURE VISIT (OUTPATIENT)
Dept: UROLOGY | Facility: CLINIC | Age: 52
End: 2022-01-11

## 2022-01-11 VITALS — BODY MASS INDEX: 28.98 KG/M2 | HEIGHT: 71 IN | WEIGHT: 207 LBS

## 2022-01-11 DIAGNOSIS — R79.89 LOW TESTOSTERONE: Primary | ICD-10-CM

## 2022-01-11 DIAGNOSIS — A63.0 CONDYLOMA ACUMINATA: ICD-10-CM

## 2022-01-11 PROCEDURE — 99213 OFFICE O/P EST LOW 20 MIN: CPT | Performed by: UROLOGY

## 2022-01-11 PROCEDURE — 54065 DESTRUCTION PENIS LESION(S): CPT | Performed by: UROLOGY

## 2022-01-11 RX ORDER — TESTOSTERONE CYPIONATE 200 MG/ML
INJECTION, SOLUTION INTRAMUSCULAR
Qty: 10 ML | Refills: 2 | Status: SHIPPED | OUTPATIENT
Start: 2022-01-11 | End: 2022-08-03

## 2022-01-11 NOTE — PROGRESS NOTES
Chief Complaint:          Chief Complaint   Patient presents with   • Wart removal       HPI:   51 y.o. male for condyloma fulguration there is a concretion with a aggregate volume of 1 cm on the right shaft of the penis.  He also has low testosterone is good to begin testosterone replacement today.  Low Testosterone: This pleasant male patient presents today with signs and symptoms that are consistent with low testosterone. He has positive Lazaro questionnaire by history, this includes both the sexual and nonsexual side effects.  Sexual side effects include inability to achieve and maintain an erection, inability to maintain his erection, and decreased interest in sexual activity.  Nonsexual symptomatology includes fatigue, difficulty completing a job, and tiredness.  We had a discussion of the various forms of testosterone available including parenteral, topical, and the form of a patch.  We discussed the efficacy of the gels and the injections, as well as the cost and benefits analysis.  We discussed the studies and talked about heart disease and its effect on prostate cancer, both of which are negligible.  He gave verbal consent to proceed with treatment.  He understands the risks and benefits of length.  He also completed his attempts at fertility. He understands the partial effect on spermatogenesis.      Past Medical History:        Past Medical History:   Diagnosis Date   • Anxiety    • Chronic pain disorder     back pain   • Depression    • Substance abuse (HCC)          Current Meds:     Current Outpatient Medications   Medication Sig Dispense Refill   • buprenorphine-naloxone (SUBOXONE) 8-2 MG per SL tablet Place 1 tablet under the tongue Daily.     • busPIRone (BUSPAR) 10 MG tablet Take 20 mg by mouth 4 (Four) Times a Day.     • ibuprofen (ADVIL,MOTRIN) 800 MG tablet TK 1 T PO  TID PRN  3   • Testosterone Cypionate (DEPOTESTOTERONE CYPIONATE) 200 MG/ML injection INJECT 1ML IM ONCE A MONTH  0   •  venlafaxine XR (EFFEXOR-XR) 37.5 MG 24 hr capsule TK ONE C PO  D  0     No current facility-administered medications for this visit.        Allergies:      No Known Allergies     Past Surgical History:     Past Surgical History:   Procedure Laterality Date   • BACK SURGERY           Social History:     Social History     Socioeconomic History   • Marital status:    Tobacco Use   • Smoking status: Current Every Day Smoker     Packs/day: 3.00     Years: 25.00     Pack years: 75.00     Types: Cigarettes   • Smokeless tobacco: Former User   Substance and Sexual Activity   • Alcohol use: Yes     Comment: seldom   • Drug use: No     Types: Benzodiazepines, Marijuana, Opium     Comment: methadone   • Sexual activity: Defer       Family History:     Family History   Problem Relation Age of Onset   • Depression Mother        Review of Systems:     Review of Systems   Constitutional: Negative.    HENT: Negative.    Eyes: Negative.    Respiratory: Negative.    Cardiovascular: Negative.    Gastrointestinal: Negative.    Endocrine: Negative.    Musculoskeletal: Negative.    Allergic/Immunologic: Negative.    Neurological: Negative.    Hematological: Negative.    Psychiatric/Behavioral: Negative.        Physical Exam:     Physical Exam  Vitals and nursing note reviewed.   Constitutional:       Appearance: He is well-developed.   HENT:      Head: Normocephalic and atraumatic.   Eyes:      Conjunctiva/sclera: Conjunctivae normal.      Pupils: Pupils are equal, round, and reactive to light.   Cardiovascular:      Rate and Rhythm: Normal rate and regular rhythm.      Heart sounds: Normal heart sounds.   Pulmonary:      Effort: Pulmonary effort is normal.      Breath sounds: Normal breath sounds.   Abdominal:      General: Bowel sounds are normal.      Palpations: Abdomen is soft.   Genitourinary:     Comments: Normal circumcised phallus with a 1 cm right collection of condyloma  Musculoskeletal:         General: Normal range  of motion.      Cervical back: Normal range of motion.   Skin:     General: Skin is warm and dry.   Neurological:      Mental Status: He is alert and oriented to person, place, and time.      Deep Tendon Reflexes: Reflexes are normal and symmetric.   Psychiatric:         Behavior: Behavior normal.         Thought Content: Thought content normal.         Judgment: Judgment normal.         I have reviewed the following portions of the patient's history: Allergies, current medications, past family history, past medical history, past social history, past surgical history, problem list, and ROS and confirm it is accurate.      Procedure:   Condyloma acuminata fulguration: After appropriate informed consent including the risks of anesthesia, bleeding, infection, and pain for at least a week, he wishes to proceed.  The condyloma were identified, prepped and draped in a sterile fashion, and the base was anesthetized with 1 mL of 1% Xylocaine.  After an adequate period of topical anesthesia, I used the Bovie electrocautery, fulgurated the lesion, and then used a gauze and removed the warty excrescence leaving a base of dermis.  Hemostasis was excellent. Bacitracin was applied.      Assessment/Plan:   Low Testosterone: This pleasant male patient presents today with signs and symptoms that are consistent with low testosterone. He has positive Lazaro questionnaire by history, this includes both the sexual and nonsexual side effects.  Sexual side effects include inability to achieve and maintain an erection, inability to maintain his erection, and decreased interest in sexual activity.  Nonsexual symptomatology includes fatigue, difficulty completing a job, and tiredness.  We had a discussion of the various forms of testosterone available including parenteral, topical, and the form of a patch.  We discussed the efficacy of the gels and the injections, as well as the cost and benefits analysis.  We discussed the studies and talked  about heart disease and its effect on prostate cancer, both of which are negligible.  He gave verbal consent to proceed with treatment.  He understands the risks and benefits of length.  He also completed his attempts at fertility. He understands the partial effect on spermatogenesis.  Condyloma acuminata-status post fulguration                  This document has been electronically signed by PEÑA BRITTON MD January 11, 2022 13:27 EST

## 2022-03-10 ENCOUNTER — OFFICE VISIT (OUTPATIENT)
Dept: UROLOGY | Facility: CLINIC | Age: 52
End: 2022-03-10

## 2022-03-10 VITALS — WEIGHT: 207 LBS | HEIGHT: 71 IN | BODY MASS INDEX: 28.98 KG/M2

## 2022-03-10 DIAGNOSIS — A63.0 CONDYLOMA ACUMINATA: Primary | ICD-10-CM

## 2022-03-10 PROCEDURE — 54065 DESTRUCTION PENIS LESION(S): CPT | Performed by: UROLOGY

## 2022-03-10 NOTE — PROGRESS NOTES
"Chief Complaint:          Chief Complaint   Patient presents with   • Condyloma   • Hypogonadism       HPI:   51 y.o. male returns today with a small concretion of recurrent condyloma at the penoscrotal junction ventrally I set him up for appropriate duration today prepped and draped and fulgurated the small 3 areas he tolerated it well he will keep his July appointment for testosterone replacement      Past Medical History:        Past Medical History:   Diagnosis Date   • Anxiety    • Chronic pain disorder     back pain   • Depression    • Substance abuse (HCC)          Current Meds:     Current Outpatient Medications   Medication Sig Dispense Refill   • buprenorphine-naloxone (SUBOXONE) 8-2 MG per SL tablet Place 1 tablet under the tongue Daily.     • busPIRone (BUSPAR) 10 MG tablet Take 20 mg by mouth 4 (Four) Times a Day.     • ibuprofen (ADVIL,MOTRIN) 800 MG tablet TK 1 T PO  TID PRN  3   • Syringe 25G X 5/8\" 3 ML misc Use as directed 2 x weekly 24 each 3   • Testosterone Cypionate (Depo-Testosterone) 200 MG/ML injection Inject 1/2 cc subcutaneously every Monday and Thursday 10 mL 2   • venlafaxine XR (EFFEXOR-XR) 37.5 MG 24 hr capsule TK ONE C PO  D  0     No current facility-administered medications for this visit.        Allergies:      No Known Allergies     Past Surgical History:     Past Surgical History:   Procedure Laterality Date   • BACK SURGERY           Social History:     Social History     Socioeconomic History   • Marital status:    Tobacco Use   • Smoking status: Current Every Day Smoker     Packs/day: 3.00     Years: 25.00     Pack years: 75.00     Types: Cigarettes   • Smokeless tobacco: Former User   Substance and Sexual Activity   • Alcohol use: Yes     Comment: seldom   • Drug use: No     Types: Benzodiazepines, Marijuana, Opium     Comment: methadone   • Sexual activity: Defer       Family History:     Family History   Problem Relation Age of Onset   • Depression Mother  "       Review of Systems:     Review of Systems   Constitutional: Negative.    HENT: Negative.    Eyes: Negative.    Respiratory: Negative.    Cardiovascular: Negative.    Gastrointestinal: Negative.    Endocrine: Negative.    Musculoskeletal: Negative.    Allergic/Immunologic: Negative.    Neurological: Negative.    Hematological: Negative.    Psychiatric/Behavioral: Negative.        Physical Exam:     Physical Exam  Vitals and nursing note reviewed.   Constitutional:       Appearance: He is well-developed.   HENT:      Head: Normocephalic and atraumatic.   Eyes:      Conjunctiva/sclera: Conjunctivae normal.      Pupils: Pupils are equal, round, and reactive to light.   Cardiovascular:      Rate and Rhythm: Normal rate and regular rhythm.      Heart sounds: Normal heart sounds.   Pulmonary:      Effort: Pulmonary effort is normal.      Breath sounds: Normal breath sounds.   Abdominal:      General: Bowel sounds are normal.      Palpations: Abdomen is soft.   Genitourinary:     Comments: 3 small condyloma at the ventral aspect of the penoscrotal junction  Musculoskeletal:         General: Normal range of motion.      Cervical back: Normal range of motion.   Skin:     General: Skin is warm and dry.   Neurological:      Mental Status: He is alert and oriented to person, place, and time.      Deep Tendon Reflexes: Reflexes are normal and symmetric.   Psychiatric:         Behavior: Behavior normal.         Thought Content: Thought content normal.         Judgment: Judgment normal.         I have reviewed the following portions of the patient's history: Allergies, current medications, past family history, past medical history, past social history, past surgical history, problem list, and ROS and confirm it is accurate.      Procedure:   Condyloma acuminata fulguration: After appropriate informed consent including the risks of anesthesia, bleeding, infection, and pain for at least a week, he wishes to proceed.  The  condyloma were identified, prepped and draped in a sterile fashion, and the base was anesthetized with 1 mL of 1% Xylocaine.  After an adequate period of topical anesthesia, I used the Bovie electrocautery, fulgurated the lesion, and then used a gauze and removed the warty excrescence leaving a base of dermis.  Hemostasis was excellent. Bacitracin was applied.      Assessment/Plan:   Condyloma acuminata-status post fulguration without complication  Testosterone-very pleased with the current regimen I will plan to see him back at his regularly scheduled appointment in July                This document has been electronically signed by PEÑA BRITTON MD March 10, 2022 14:52 EST

## 2022-08-02 DIAGNOSIS — R79.89 LOW TESTOSTERONE: ICD-10-CM

## 2022-08-03 RX ORDER — TESTOSTERONE CYPIONATE 200 MG/ML
INJECTION, SOLUTION INTRAMUSCULAR
Qty: 10 ML | Refills: 3 | Status: SHIPPED | OUTPATIENT
Start: 2022-08-03 | End: 2023-03-02 | Stop reason: SDUPTHER

## 2022-11-07 ENCOUNTER — OFFICE VISIT (OUTPATIENT)
Dept: UROLOGY | Facility: CLINIC | Age: 52
End: 2022-11-07

## 2022-11-07 VITALS — HEIGHT: 71 IN | WEIGHT: 207 LBS | BODY MASS INDEX: 28.98 KG/M2

## 2022-11-07 DIAGNOSIS — A63.0 CONDYLOMA ACUMINATA: Primary | ICD-10-CM

## 2022-11-07 PROCEDURE — 99213 OFFICE O/P EST LOW 20 MIN: CPT | Performed by: UROLOGY

## 2022-11-07 NOTE — PROGRESS NOTES
"Chief Complaint:      Chief Complaint   Patient presents with   • Condyloma     Follow up        HPI:   51 y.o. male status post fulguration of a condyloma on the shaft of his penis about 0.5 cm in diameter unsuccessfully x2 he would like it excised certainly reasonable I will arrange a excisional biopsy of that area with pathology.    Past Medical History:     Past Medical History:   Diagnosis Date   • Anxiety    • Chronic pain disorder     back pain   • Depression    • Substance abuse (HCC)        Current Meds:     Current Outpatient Medications   Medication Sig Dispense Refill   • buprenorphine-naloxone (SUBOXONE) 8-2 MG per SL tablet Place 1 tablet under the tongue Daily.     • busPIRone (BUSPAR) 10 MG tablet Take 20 mg by mouth 4 (Four) Times a Day.     • ibuprofen (ADVIL,MOTRIN) 800 MG tablet TK 1 T PO  TID PRN  3   • Syringe 25G X 5/8\" 3 ML misc Use as directed 2 x weekly 24 each 3   • Testosterone Cypionate (DEPOTESTOTERONE CYPIONATE) 200 MG/ML injection INJECT 0.5 ML UNDER SKIN EVERY MONDAY AND THURSDAY 10 mL 3   • venlafaxine XR (EFFEXOR-XR) 37.5 MG 24 hr capsule TK ONE C PO  D  0     No current facility-administered medications for this visit.        Allergies:      No Known Allergies     Past Surgical History:     Past Surgical History:   Procedure Laterality Date   • BACK SURGERY         Social History:     Social History     Socioeconomic History   • Marital status:    Tobacco Use   • Smoking status: Every Day     Packs/day: 3.00     Years: 25.00     Pack years: 75.00     Types: Cigarettes   • Smokeless tobacco: Former   Substance and Sexual Activity   • Alcohol use: Yes     Comment: seldom   • Drug use: No     Types: Benzodiazepines, Marijuana, Opium     Comment: methadone   • Sexual activity: Defer       Family History:     Family History   Problem Relation Age of Onset   • Depression Mother        Review of Systems:     Review of Systems   Constitutional: Negative.    HENT: Negative.    Eyes: " Negative.    Respiratory: Negative.    Cardiovascular: Negative.    Gastrointestinal: Negative.    Endocrine: Negative.    Musculoskeletal: Negative.    Allergic/Immunologic: Negative.    Neurological: Negative.    Hematological: Negative.    Psychiatric/Behavioral: Negative.        Physical Exam:     Physical Exam  Vitals and nursing note reviewed.   Constitutional:       Appearance: He is well-developed.   HENT:      Head: Normocephalic and atraumatic.   Eyes:      Conjunctiva/sclera: Conjunctivae normal.      Pupils: Pupils are equal, round, and reactive to light.   Cardiovascular:      Rate and Rhythm: Normal rate and regular rhythm.      Heart sounds: Normal heart sounds.   Pulmonary:      Effort: Pulmonary effort is normal.      Breath sounds: Normal breath sounds.   Abdominal:      General: Bowel sounds are normal.      Palpations: Abdomen is soft.   Genitourinary:     Comments: 0.5 cm condyloma on the right shaft of the penis at the penoscrotal junction  Musculoskeletal:         General: Normal range of motion.      Cervical back: Normal range of motion.   Skin:     General: Skin is warm and dry.   Neurological:      Mental Status: He is alert and oriented to person, place, and time.      Deep Tendon Reflexes: Reflexes are normal and symmetric.   Psychiatric:         Behavior: Behavior normal.         Thought Content: Thought content normal.         Judgment: Judgment normal.         I have reviewed the following portions of the patient's history: Allergies, current medications, past family history, past medical history, past social history, past surgical history, problem list, and ROS and confirm it is accurate.    Recent Image (CT and/or KUB):      CT Abdomen and Pelvis: No results found for this or any previous visit.       CT Stone Protocol: No results found for this or any previous visit.       KUB: No results found for this or any previous visit.       Labs (past 3 months):      No visits with results  within 3 Month(s) from this visit.   Latest known visit with results is:   Office Visit on 11/08/2021   Component Date Value Ref Range Status   • WBC 11/08/2021 7.03  3.40 - 10.80 10*3/mm3 Final   • RBC 11/08/2021 4.96  4.14 - 5.80 10*6/mm3 Final   • Hemoglobin 11/08/2021 15.2  13.0 - 17.7 g/dL Final   • Hematocrit 11/08/2021 47.3  37.5 - 51.0 % Final   • MCV 11/08/2021 95.4  79.0 - 97.0 fL Final   • MCH 11/08/2021 30.6  26.6 - 33.0 pg Final   • MCHC 11/08/2021 32.1  31.5 - 35.7 g/dL Final   • RDW 11/08/2021 12.9  12.3 - 15.4 % Final   • Platelets 11/08/2021 310  140 - 450 10*3/mm3 Final   • PSA 11/08/2021 0.822  0.000 - 4.000 ng/mL Final    Results may be falsely decreased if patient taking Biotin.   • Testosterone, Total 11/08/2021 145 (L)  264 - 916 ng/dL Final    Comment: Adult male reference interval is based on a population of  healthy nonobese males (BMI <30) between 19 and 39 years old.  Anu et.al. JCEM 2017,102;[id number]. PMID: [id number].     • Estradiol 11/08/2021 10.7  7.6 - 42.6 pg/mL Final    Roche ECLIA methodology        Procedure:       Assessment/Plan:   Condyloma-4 excisional biopsy              This document has been electronically signed by PEÑA BRITTON MD November 7, 2022 13:58 EST    Dictated Utilizing Dragon Dictation: Part of this note may be an electronic transcription/translation of spoken language to printed text using the Dragon Dictation System.

## 2023-01-09 ENCOUNTER — PROCEDURE VISIT (OUTPATIENT)
Dept: UROLOGY | Facility: CLINIC | Age: 53
End: 2023-01-09
Payer: COMMERCIAL

## 2023-01-09 VITALS — HEIGHT: 71 IN | WEIGHT: 206 LBS | BODY MASS INDEX: 28.84 KG/M2

## 2023-01-09 DIAGNOSIS — A63.0 CONDYLOMA ACUMINATA: Primary | ICD-10-CM

## 2023-01-09 PROCEDURE — 11400 EXC TR-EXT B9+MARG 0.5 CM<: CPT | Performed by: UROLOGY

## 2023-01-09 NOTE — PROGRESS NOTES
Chief Complaint:      Chief Complaint   Patient presents with   • condyloma      Skin removal        HPI:   52 y.o. male 0.5 cm recurrent condyloma just below the glans on the left ventral penis at 6:00 he is here for excision prepped and draped in a sterile fashion anesthetized with 1 cc of 1% plain Xylocaine was excised closed with 2 sutures and sent for pathologic confirmation to the satellites were lightly fulgurated no complications were encountered    Past Medical History:     Past Medical History:   Diagnosis Date   • Anxiety    • Chronic pain disorder     back pain   • Depression    • Substance abuse (HCC)        Current Meds:     Current Outpatient Medications   Medication Sig Dispense Refill   • buprenorphine-naloxone (SUBOXONE) 8-2 MG per SL tablet Place 1 tablet under the tongue Daily.     • busPIRone (BUSPAR) 10 MG tablet Take 20 mg by mouth 4 (Four) Times a Day.     • ibuprofen (ADVIL,MOTRIN) 800 MG tablet TK 1 T PO  TID PRN  3   • Syringe 25G X 5/8\" 3 ML misc Use as directed 2 x weekly 24 each 3   • Testosterone Cypionate (DEPOTESTOTERONE CYPIONATE) 200 MG/ML injection INJECT 0.5 ML UNDER SKIN EVERY MONDAY AND THURSDAY 10 mL 3   • venlafaxine XR (EFFEXOR-XR) 37.5 MG 24 hr capsule TK ONE C PO  D  0     No current facility-administered medications for this visit.        Allergies:      No Known Allergies     Past Surgical History:     Past Surgical History:   Procedure Laterality Date   • BACK SURGERY         Social History:     Social History     Socioeconomic History   • Marital status:    Tobacco Use   • Smoking status: Every Day     Packs/day: 3.00     Years: 25.00     Pack years: 75.00     Types: Cigarettes   • Smokeless tobacco: Former   Substance and Sexual Activity   • Alcohol use: Yes     Comment: seldom   • Drug use: No     Types: Benzodiazepines, Marijuana, Opium     Comment: methadone   • Sexual activity: Defer       Family History:     Family History   Problem Relation Age of Onset    • Depression Mother        Review of Systems:     Review of Systems   Constitutional: Negative.    HENT: Negative.    Eyes: Negative.    Respiratory: Negative.    Cardiovascular: Negative.    Gastrointestinal: Negative.    Endocrine: Negative.    Musculoskeletal: Negative.    Allergic/Immunologic: Negative.    Neurological: Negative.    Hematological: Negative.    Psychiatric/Behavioral: Negative.        Physical Exam:     Physical Exam  Vitals and nursing note reviewed.   Constitutional:       Appearance: He is well-developed.   HENT:      Head: Normocephalic and atraumatic.   Eyes:      Conjunctiva/sclera: Conjunctivae normal.      Pupils: Pupils are equal, round, and reactive to light.   Cardiovascular:      Rate and Rhythm: Normal rate and regular rhythm.      Heart sounds: Normal heart sounds.   Pulmonary:      Effort: Pulmonary effort is normal.      Breath sounds: Normal breath sounds.   Abdominal:      General: Bowel sounds are normal.      Palpations: Abdomen is soft.   Genitourinary:     Comments: Normal circumcised phallus with 0.5 cm small flat condyloma 2 cm behind the coronal rim at the ventral aspect at 6 PM  Musculoskeletal:         General: Normal range of motion.      Cervical back: Normal range of motion.   Skin:     General: Skin is warm and dry.   Neurological:      Mental Status: He is alert and oriented to person, place, and time.      Deep Tendon Reflexes: Reflexes are normal and symmetric.   Psychiatric:         Behavior: Behavior normal.         Thought Content: Thought content normal.         Judgment: Judgment normal.         I have reviewed the following portions of the patient's history: Allergies, current medications, past family history, past medical history, past social history, past surgical history, problem list, and ROS and confirm it is accurate.    Recent Image (CT and/or KUB):      CT Abdomen and Pelvis: No results found for this or any previous visit.       CT Stone  Protocol: No results found for this or any previous visit.       KUB: No results found for this or any previous visit.       Labs (past 3 months):      No visits with results within 3 Month(s) from this visit.   Latest known visit with results is:   Office Visit on 11/08/2021   Component Date Value Ref Range Status   • WBC 11/08/2021 7.03  3.40 - 10.80 10*3/mm3 Final   • RBC 11/08/2021 4.96  4.14 - 5.80 10*6/mm3 Final   • Hemoglobin 11/08/2021 15.2  13.0 - 17.7 g/dL Final   • Hematocrit 11/08/2021 47.3  37.5 - 51.0 % Final   • MCV 11/08/2021 95.4  79.0 - 97.0 fL Final   • MCH 11/08/2021 30.6  26.6 - 33.0 pg Final   • MCHC 11/08/2021 32.1  31.5 - 35.7 g/dL Final   • RDW 11/08/2021 12.9  12.3 - 15.4 % Final   • Platelets 11/08/2021 310  140 - 450 10*3/mm3 Final   • PSA 11/08/2021 0.822  0.000 - 4.000 ng/mL Final    Results may be falsely decreased if patient taking Biotin.   • Testosterone, Total 11/08/2021 145 (L)  264 - 916 ng/dL Final    Comment: Adult male reference interval is based on a population of  healthy nonobese males (BMI <30) between 19 and 39 years old.  Anu, et.al. JCEM 2017,102;2007-3363. PMID: 61829713.     • Estradiol 11/08/2021 10.7  7.6 - 42.6 pg/mL Final    Roche ECLIA methodology        Procedure:   Condyloma after prepped and draped in sterile fashion and filtrated half cc 1% Xylocaine and the small lesion 2 cm below the coronal rim at 6:00 excised completely and reapproximating the edges with 4-0 chromic sutures was done without complication    Assessment/Plan:   Condyloma-small recurrent recalcitrant to fulguration x2 excised without complication          This document has been electronically signed by PEÑA BRITTON MD January 9, 2023 13:37 EST    Dictated Utilizing Dragon Dictation: Part of this note may be an electronic transcription/translation of spoken language to printed text using the Dragon Dictation System.

## 2023-02-27 ENCOUNTER — TELEPHONE (OUTPATIENT)
Dept: UROLOGY | Facility: CLINIC | Age: 53
End: 2023-02-27
Payer: COMMERCIAL

## 2023-02-27 DIAGNOSIS — R79.89 LOW TESTOSTERONE: ICD-10-CM

## 2023-02-27 NOTE — TELEPHONE ENCOUNTER
Pt needs a refill on his testosterone sent to [facility] in [place].  He was last seen on 1/9/23 and did not get a refill.

## 2023-02-28 RX ORDER — TESTOSTERONE CYPIONATE 200 MG/ML
INJECTION, SOLUTION INTRAMUSCULAR
Qty: 10 ML | OUTPATIENT
Start: 2023-02-28

## 2023-03-02 DIAGNOSIS — R79.89 LOW TESTOSTERONE: ICD-10-CM

## 2023-03-02 RX ORDER — TESTOSTERONE CYPIONATE 200 MG/ML
INJECTION, SOLUTION INTRAMUSCULAR
Qty: 10 ML | Refills: 3 | Status: SHIPPED | OUTPATIENT
Start: 2023-03-02

## 2023-11-13 ENCOUNTER — OFFICE VISIT (OUTPATIENT)
Dept: UROLOGY | Facility: CLINIC | Age: 53
End: 2023-11-13
Payer: COMMERCIAL

## 2023-11-13 VITALS
HEART RATE: 89 BPM | DIASTOLIC BLOOD PRESSURE: 99 MMHG | HEIGHT: 71 IN | WEIGHT: 217.6 LBS | SYSTOLIC BLOOD PRESSURE: 176 MMHG | BODY MASS INDEX: 30.46 KG/M2

## 2023-11-13 DIAGNOSIS — R79.89 LOW TESTOSTERONE: ICD-10-CM

## 2023-11-13 PROCEDURE — 84153 ASSAY OF PSA TOTAL: CPT | Performed by: UROLOGY

## 2023-11-13 PROCEDURE — 82670 ASSAY OF TOTAL ESTRADIOL: CPT | Performed by: UROLOGY

## 2023-11-13 PROCEDURE — 85027 COMPLETE CBC AUTOMATED: CPT | Performed by: UROLOGY

## 2023-11-13 PROCEDURE — 84403 ASSAY OF TOTAL TESTOSTERONE: CPT | Performed by: UROLOGY

## 2023-11-13 RX ORDER — TESTOSTERONE CYPIONATE 200 MG/ML
INJECTION, SOLUTION INTRAMUSCULAR
Qty: 10 ML | Refills: 3 | Status: SHIPPED | OUTPATIENT
Start: 2023-11-13

## 2023-11-13 NOTE — PROGRESS NOTES
"Chief Complaint:      Chief Complaint   Patient presents with    low testosterone     condyloma      Follow up        HPI:   52 y.o. male patient returns today for follow-up.  He has been on testosterone replacement therapy.  He reports a dramatic improvement in his FLORENCIO questionnaire: -FLORENCIO-androgen deficiency in the age male questionnaire. The patient was queried regarding the androgen deficiency in the age male questionnaire.  This is a validated questionnaire that was performed on a set of 314 [place] male physicians. When it was positive it correlated directly with a 94% chance of low testosterone.  Patient indicates there is a decrease in libido or sex drive, a lack of energy, decreased  strength and endurance, a decreased \"enjoyment of life\", sad and grumpy feelings with significant difficulty maintaining erections.  There has also been a recent deterioration regarding work performance. He reports weight loss.  He has good facility and the use of subcutaneous and intramuscular injections as well as comfort level and using the medication in a sterile fashion.  He understands he should use only the prescribed dose.  He is here for appropriate lab monitoring regarding this.  He understands this is a controlled substance and therefore must be watched closely, will not be refilled in the medical loss or miscalculation of the dose.  He is very happy with the treatment and therefore wants to continue it.    Past Medical History:     Past Medical History:   Diagnosis Date    Anxiety     Chronic pain disorder     back pain    Depression     Substance abuse        Current Meds:     Current Outpatient Medications   Medication Sig Dispense Refill    buprenorphine-naloxone (SUBOXONE) 8-2 MG per SL tablet Place 1 tablet under the tongue Daily.      busPIRone (BUSPAR) 10 MG tablet Take 2 tablets by mouth 4 (Four) Times a Day.      ibuprofen (ADVIL,MOTRIN) 800 MG tablet TK 1 T PO  TID PRN  3    Syringe 25G X 5/8\" 3 ML misc " Use as directed 2 x weekly 24 each 3    Testosterone Cypionate (DEPOTESTOTERONE CYPIONATE) 200 MG/ML injection INJECT 0.5 ML UNDER SKIN EVERY MONDAY AND THURSDAY 10 mL 3    venlafaxine XR (EFFEXOR-XR) 37.5 MG 24 hr capsule TK ONE C PO  D  0     No current facility-administered medications for this visit.        Allergies:      No Known Allergies     Past Surgical History:     Past Surgical History:   Procedure Laterality Date    BACK SURGERY         Social History:     Social History     Socioeconomic History    Marital status: Single   Tobacco Use    Smoking status: Every Day     Packs/day: 3.00     Years: 25.00     Additional pack years: 0.00     Total pack years: 75.00     Types: Cigarettes    Smokeless tobacco: Former   Substance and Sexual Activity    Alcohol use: Yes     Comment: seldom    Drug use: No     Types: Benzodiazepines, Marijuana, Opium     Comment: methadone    Sexual activity: Defer       Family History:     Family History   Problem Relation Age of Onset    Depression Mother        Review of Systems:     Review of Systems   Constitutional: Negative.    HENT: Negative.     Eyes: Negative.    Respiratory: Negative.     Cardiovascular: Negative.    Gastrointestinal: Negative.    Endocrine: Negative.    Musculoskeletal: Negative.    Allergic/Immunologic: Negative.    Neurological: Negative.    Hematological: Negative.    Psychiatric/Behavioral: Negative.         Physical Exam:     Physical Exam  Vitals and nursing note reviewed.   Constitutional:       Appearance: He is well-developed.   HENT:      Head: Normocephalic and atraumatic.   Eyes:      Conjunctiva/sclera: Conjunctivae normal.      Pupils: Pupils are equal, round, and reactive to light.   Cardiovascular:      Rate and Rhythm: Normal rate and regular rhythm.      Heart sounds: Normal heart sounds.   Pulmonary:      Effort: Pulmonary effort is normal.      Breath sounds: Normal breath sounds.   Abdominal:      General: Bowel sounds are normal.       Palpations: Abdomen is soft.   Musculoskeletal:         General: Normal range of motion.      Cervical back: Normal range of motion.   Skin:     General: Skin is warm and dry.   Neurological:      Mental Status: He is alert and oriented to person, place, and time.      Deep Tendon Reflexes: Reflexes are normal and symmetric.   Psychiatric:         Behavior: Behavior normal.         Thought Content: Thought content normal.         Judgment: Judgment normal.         I have reviewed the following portions of the patient's history: Allergies, current medications, past family history, past medical history, past social history, past surgical history, problem list, and ROS and confirm it is accurate.  Recent Image (CT and/or KUB):      CT Abdomen and Pelvis: No results found for this or any previous visit.       CT Stone Protocol: No results found for this or any previous visit.       KUB: No results found for this or any previous visit.       Labs (past 3 months):      No visits with results within 3 Month(s) from this visit.   Latest known visit with results is:   Office Visit on 11/08/2021   Component Date Value Ref Range Status    WBC 11/08/2021 7.03  3.40 - 10.80 10*3/mm3 Final    RBC 11/08/2021 4.96  4.14 - 5.80 10*6/mm3 Final    Hemoglobin 11/08/2021 15.2  13.0 - 17.7 g/dL Final    Hematocrit 11/08/2021 47.3  37.5 - 51.0 % Final    MCV 11/08/2021 95.4  79.0 - 97.0 fL Final    MCH 11/08/2021 30.6  26.6 - 33.0 pg Final    MCHC 11/08/2021 32.1  31.5 - 35.7 g/dL Final    RDW 11/08/2021 12.9  12.3 - 15.4 % Final    Platelets 11/08/2021 310  140 - 450 10*3/mm3 Final    PSA 11/08/2021 0.822  0.000 - 4.000 ng/mL Final    Results may be falsely decreased if patient taking Biotin.    Testosterone, Total 11/08/2021 145 (L)  264 - 916 ng/dL Final    Comment: Adult male reference interval is based on a population of  healthy nonobese males (BMI <30) between 19 and 39 years old.  Anu et.al. JCEM 2017,102;2498-1632.  PMID: 65721596.      Estradiol 11/08/2021 10.7  7.6 - 42.6 pg/mL Final    Roche ECLIA methodology        Procedure:       Assessment/Plan:   Low testosterone: Patient is here for follow-up.  Since beginning the medication, he has been very pleased.  He reports a dramatic improvement in his erections, ability to achieve and maintain an erection, improvement in libido, increase in frequency of morning erections, and a noticeable weight loss consistent with the treatment.   He is going to have appropriate safety laboratory parameters checked.   He understands that the new data implicates testosterone with the development of prostate cancer and this is all but been disproven and the medical literature as well as the risks of cardiovascular disease which has actually also been disproven.  He understands that while he is a candidate for topical therapy if he is in contact with children this is not an option because it has been shown to accentuate genitalia development at an early age that is frequently irreversible.  He also understands this it is a controlled substance and as such will not be prescribed without appropriate follow-up and appropriate laboratory investigation.  He understands effects on spermatogenesis including the fact that this is not always completely reversible and not always completely limited his ability to father a child.  He has demonstrated facility in the technique of both intramuscular and subcutaneous injection and has been taught sterility when drawing up the medication.    PSA testing-I am recommending a PSA blood test that stands for prostate specific antigen.  I discussed the pathophysiology of PSA testing indicating its use in the diagnosis and management of prostate cancer.  I discussed the normal range being 0 to 4, but more appropriately being much closer to 0 to 2 in a normal male.  I discussed the fact that after a certain age we don't recommend PSA testing especially in view of  numerous comorbidities, that this will not be a useful test.  I discussed many of the things that can artificially raise PSA including a recent infection, urinary tract infection, and recent sexual intercourse, or even the type of movement such as manipulation of the prostate from riding a bicycle.  After all this is taken into account when the test is reviewed, the most important use of PSA is the velocity measurement.  In other words, the change of PSA with time is a very important factor in the use and that we look for greater than 20% rise over a year to help us make the prediction of prostate cancer.  I also discussed that the use with prostate cancer indicating that after a radical prostatectomy, the PSA should be 0 and any rise indicates an early biochemical recurrence.    Hyperestrogenism-we spoke about the role of estrogen metabolism and breakdown in the  presence of testosterone replacement therapy.  We spoke about how high estradiol levels can interfere with the improvement noted in a man on testosterone as well as significant side effects such as pseudogynecomastia.  We discussed the use of the medication Arimidex used in an off label setting and using a very judicious low-dose fashion to prevent too low of an estradiol which would precipitate bone complications.  Going to check an estradiol level.  He is at higher risk for breast problems due to his replacement    Polycythemia-I am going to check a CBC to rule out hemoglobin changes.  We utilized the American Heart Association guidelines for polycythemia which is a hemoglobin greater than 18 and a hematocrit greater than 54.5.  Recommend therapeutic phlebotomy as the treatment.  It is important that we indicate that is the most likely cause of the polycythemia.  We also discussed the possibility of decreasing the dose of testosterone and of stopping it altogether.    Controlled substance-he understands this is a controlled substance and as such is  regulated under the state.  I cannot refill it outside the prescribed window.  I stressed the importance of follow-up and appropriate laboratory parameters monitoring.    Liver function tests-according to the AUA guidelines we will not check liver functions due to the fact this is not an oral alkylated testosterone          This document has been electronically signed by PEÑA BRITTON MD November 13, 2023 14:52 EST    Dictated Utilizing Dragon Dictation: Part of this note may be an electronic transcription/translation of spoken language to printed text using the Dragon Dictation System.

## 2023-11-14 ENCOUNTER — TELEPHONE (OUTPATIENT)
Dept: UROLOGY | Facility: CLINIC | Age: 53
End: 2023-11-14
Payer: COMMERCIAL

## 2023-11-14 DIAGNOSIS — R79.89 LOW TESTOSTERONE: Primary | ICD-10-CM

## 2023-11-14 LAB
DEPRECATED RDW RBC AUTO: 41.5 FL (ref 37–54)
ERYTHROCYTE [DISTWIDTH] IN BLOOD BY AUTOMATED COUNT: 12.5 % (ref 12.3–15.4)
ESTRADIOL SERPL HS-MCNC: 54.3 PG/ML
HCT VFR BLD AUTO: 49.1 % (ref 37.5–51)
HGB BLD-MCNC: 16.8 G/DL (ref 13–17.7)
MCH RBC QN AUTO: 31.4 PG (ref 26.6–33)
MCHC RBC AUTO-ENTMCNC: 34.2 G/DL (ref 31.5–35.7)
MCV RBC AUTO: 91.8 FL (ref 79–97)
PLATELET # BLD AUTO: 329 10*3/MM3 (ref 140–450)
PMV BLD AUTO: 10.8 FL (ref 6–12)
PSA SERPL-MCNC: 0.88 NG/ML (ref 0–4)
RBC # BLD AUTO: 5.35 10*6/MM3 (ref 4.14–5.8)
TESTOST SERPL-MCNC: 1434 NG/DL (ref 193–740)
WBC NRBC COR # BLD: 7.2 10*3/MM3 (ref 3.4–10.8)

## 2023-11-14 RX ORDER — ANASTROZOLE 1 MG/1
1 TABLET ORAL WEEKLY
Qty: 12 TABLET | Refills: 3 | Status: SHIPPED | OUTPATIENT
Start: 2023-11-14

## 2023-11-14 NOTE — TELEPHONE ENCOUNTER
I called the pt and let him know that his labs overall were great. However, we did notice that his estradiol level was elevated and what we recommend for this is a round of armidex it is a 1 mg tablet you take weekly for 12 weeks and then when retested see if you need another round. 3 refills given in case of that and I confirmed his pharmacy.    ----- Message from Jian Melgar MD sent at 11/14/2023  7:16 AM EST -----  arimidex  ----- Message -----  From: Lab, Background User  Sent: 11/14/2023   2:03 AM EST  To: Jian Melgar MD

## 2024-05-13 ENCOUNTER — OFFICE VISIT (OUTPATIENT)
Dept: UROLOGY | Facility: CLINIC | Age: 54
End: 2024-05-13
Payer: COMMERCIAL

## 2024-05-13 VITALS
BODY MASS INDEX: 29.68 KG/M2 | SYSTOLIC BLOOD PRESSURE: 168 MMHG | WEIGHT: 212 LBS | HEIGHT: 71 IN | HEART RATE: 71 BPM | DIASTOLIC BLOOD PRESSURE: 83 MMHG

## 2024-05-13 DIAGNOSIS — R79.89 LOW TESTOSTERONE: ICD-10-CM

## 2024-05-13 DIAGNOSIS — A63.0 CONDYLOMA ACUMINATA: Primary | ICD-10-CM

## 2024-05-13 PROCEDURE — 84153 ASSAY OF PSA TOTAL: CPT | Performed by: UROLOGY

## 2024-05-13 PROCEDURE — 99214 OFFICE O/P EST MOD 30 MIN: CPT | Performed by: UROLOGY

## 2024-05-13 PROCEDURE — 84403 ASSAY OF TOTAL TESTOSTERONE: CPT | Performed by: UROLOGY

## 2024-05-13 PROCEDURE — 82670 ASSAY OF TOTAL ESTRADIOL: CPT | Performed by: UROLOGY

## 2024-05-13 PROCEDURE — 85027 COMPLETE CBC AUTOMATED: CPT | Performed by: UROLOGY

## 2024-05-13 RX ORDER — TESTOSTERONE CYPIONATE 200 MG/ML
INJECTION, SOLUTION INTRAMUSCULAR
Qty: 10 ML | Refills: 3 | Status: SHIPPED | OUTPATIENT
Start: 2024-05-13

## 2024-05-13 NOTE — PROGRESS NOTES
"Chief Complaint:      Chief Complaint   Patient presents with    Low Testosterone      6 month        HPI:   53 y.o. male patient returns today for follow-up.  He has been on testosterone replacement therapy.  He reports a dramatic improvement in his FLORENCIO questionnaire: -FLORENCIO-androgen deficiency in the age male questionnaire. The patient was queried regarding the androgen deficiency in the age male questionnaire.  This is a validated questionnaire that was performed on a set of 314 [place] male physicians. When it was positive it correlated directly with a 94% chance of low testosterone.  Patient indicates there is a decrease in libido or sex drive, a lack of energy, decreased  strength and endurance, a decreased \"enjoyment of life\", sad and grumpy feelings with significant difficulty maintaining erections.  There has also been a recent deterioration regarding work performance. He reports weight loss.  He has good facility and the use of subcutaneous and intramuscular injections as well as comfort level and using the medication in a sterile fashion.  He understands he should use only the prescribed dose.  He is here for appropriate lab monitoring regarding this.  He understands this is a controlled substance and therefore must be watched closely, will not be refilled in the medical loss or miscalculation of the dose.  He is very happy with the treatment and therefore wants to continue it.  He also had 2 tiny recurrent Helio's I fulgurated without complication.    Past Medical History:     Past Medical History:   Diagnosis Date    Anxiety     Chronic pain disorder     back pain    Depression     Substance abuse        Current Meds:     Current Outpatient Medications   Medication Sig Dispense Refill    anastrozole (ARIMIDEX) 1 MG tablet Take 1 tablet by mouth 1 (One) Time Per Week. For 12 weeks refills in case another round is needed in the future. 12 tablet 3    buprenorphine-naloxone (SUBOXONE) 8-2 MG per SL " "tablet Place 1 tablet under the tongue Daily.      busPIRone (BUSPAR) 10 MG tablet Take 2 tablets by mouth 4 (Four) Times a Day.      ibuprofen (ADVIL,MOTRIN) 800 MG tablet TK 1 T PO  TID PRN  3    Syringe 25G X 5/8\" 3 ML misc Use as directed 2 x weekly 24 each 3    Testosterone Cypionate (DEPOTESTOTERONE CYPIONATE) 200 MG/ML injection INJECT 0.5 ML UNDER SKIN EVERY MONDAY AND THURSDAY 10 mL 3    venlafaxine XR (EFFEXOR-XR) 37.5 MG 24 hr capsule TK ONE C PO  D  0     No current facility-administered medications for this visit.        Allergies:      No Known Allergies     Past Surgical History:     Past Surgical History:   Procedure Laterality Date    BACK SURGERY         Social History:     Social History     Socioeconomic History    Marital status: Single   Tobacco Use    Smoking status: Every Day     Current packs/day: 3.00     Average packs/day: 3.0 packs/day for 25.0 years (75.0 ttl pk-yrs)     Types: Cigarettes    Smokeless tobacco: Former   Substance and Sexual Activity    Alcohol use: Yes     Comment: seldom    Drug use: No     Types: Benzodiazepines, Marijuana, Opium     Comment: methadone    Sexual activity: Defer       Family History:     Family History   Problem Relation Age of Onset    Depression Mother        Review of Systems:     Review of Systems   Constitutional: Negative.    HENT: Negative.     Eyes: Negative.    Respiratory: Negative.     Cardiovascular: Negative.    Gastrointestinal: Negative.    Endocrine: Negative.    Musculoskeletal: Negative.    Allergic/Immunologic: Negative.    Neurological: Negative.    Hematological: Negative.    Psychiatric/Behavioral: Negative.         Physical Exam:     Physical Exam  Vitals and nursing note reviewed.   Constitutional:       Appearance: He is well-developed.   HENT:      Head: Normocephalic and atraumatic.   Eyes:      Conjunctiva/sclera: Conjunctivae normal.      Pupils: Pupils are equal, round, and reactive to light.   Cardiovascular:      Rate and " Rhythm: Normal rate and regular rhythm.      Heart sounds: Normal heart sounds.   Pulmonary:      Effort: Pulmonary effort is normal.      Breath sounds: Normal breath sounds.   Abdominal:      General: Bowel sounds are normal.      Palpations: Abdomen is soft.   Musculoskeletal:         General: Normal range of motion.      Cervical back: Normal range of motion.   Skin:     General: Skin is warm and dry.   Neurological:      Mental Status: He is alert and oriented to person, place, and time.      Deep Tendon Reflexes: Reflexes are normal and symmetric.   Psychiatric:         Behavior: Behavior normal.         Thought Content: Thought content normal.         Judgment: Judgment normal.         I have reviewed the following portions of the patient's history: Allergies, current medications, past family history, past medical history, past social history, past surgical history, problem list, and ROS and confirm it is accurate.    Recent Image (CT and/or KUB):      CT Abdomen and Pelvis: No results found for this or any previous visit.       CT Stone Protocol: No results found for this or any previous visit.       KUB: No results found for this or any previous visit.       Labs (past 3 months):      No visits with results within 3 Month(s) from this visit.   Latest known visit with results is:   Office Visit on 11/13/2023   Component Date Value Ref Range Status    PSA 11/13/2023 0.878  0.000 - 4.000 ng/mL Final    Testosterone, Total 11/13/2023 1,434.00 (H)  193.00 - 740.00 ng/dL Final    Estradiol 11/13/2023 54.3  pg/mL Final    WBC 11/13/2023 7.20  3.40 - 10.80 10*3/mm3 Final    RBC 11/13/2023 5.35  4.14 - 5.80 10*6/mm3 Final    Hemoglobin 11/13/2023 16.8  13.0 - 17.7 g/dL Final    Hematocrit 11/13/2023 49.1  37.5 - 51.0 % Final    MCV 11/13/2023 91.8  79.0 - 97.0 fL Final    MCH 11/13/2023 31.4  26.6 - 33.0 pg Final    MCHC 11/13/2023 34.2  31.5 - 35.7 g/dL Final    RDW 11/13/2023 12.5  12.3 - 15.4 % Final    RDW-SD  11/13/2023 41.5  37.0 - 54.0 fl Final    MPV 11/13/2023 10.8  6.0 - 12.0 fL Final    Platelets 11/13/2023 329  140 - 450 10*3/mm3 Final        Procedure:       Assessment/Plan:   Low testosterone: Patient is here for follow-up.  Since beginning the medication, he has been very pleased.  He reports a dramatic improvement in his erections, ability to achieve and maintain an erection, improvement in libido, increase in frequency of morning erections, and a noticeable weight loss consistent with the treatment.   He is going to have appropriate safety laboratory parameters checked.   He understands that the new data implicates testosterone with the development of prostate cancer and this is all but been disproven and the medical literature as well as the risks of cardiovascular disease which has actually also been disproven.  He understands that while he is a candidate for topical therapy if he is in contact with children this is not an option because it has been shown to accentuate genitalia development at an early age that is frequently irreversible.  He also understands this it is a controlled substance and as such will not be prescribed without appropriate follow-up and appropriate laboratory investigation.  He understands effects on spermatogenesis including the fact that this is not always completely reversible and not always completely limited his ability to father a child.  He has demonstrated facility in the technique of both intramuscular and subcutaneous injection and has been taught sterility when drawing up the medication.    PSA testing-I am recommending a PSA blood test that stands for prostate specific antigen.  I discussed the pathophysiology of PSA testing indicating its use in the diagnosis and management of prostate cancer.  I discussed the normal range being 0 to 4, but more appropriately being much closer to 0 to 2 in a normal male.  I discussed the fact that after a certain age we don't recommend  PSA testing especially in view of numerous comorbidities, that this will not be a useful test.  I discussed many of the things that can artificially raise PSA including a recent infection, urinary tract infection, and recent sexual intercourse, or even the type of movement such as manipulation of the prostate from riding a bicycle.  After all this is taken into account when the test is reviewed, the most important use of PSA is the velocity measurement.  In other words, the change of PSA with time is a very important factor in the use and that we look for greater than 20% rise over a year to help us make the prediction of prostate cancer.  I also discussed that the use with prostate cancer indicating that after a radical prostatectomy, the PSA should be 0 and any rise indicates an early biochemical recurrence.    Hyperestrogenism-we spoke about the role of estrogen metabolism and breakdown in the  presence of testosterone replacement therapy.  We spoke about how high estradiol levels can interfere with the improvement noted in a man on testosterone as well as significant side effects such as pseudogynecomastia.  We discussed the use of the medication Arimidex used in an off label setting and using a very judicious low-dose fashion to prevent too low of an estradiol which would precipitate bone complications.  Going to check an estradiol level.  He is at higher risk for breast problems due to his replacement    Polycythemia-I am going to check a CBC to rule out hemoglobin changes.  We utilized the American Heart Association guidelines for polycythemia which is a hemoglobin greater than 18 and a hematocrit greater than 54.5.  Recommend therapeutic phlebotomy as the treatment.  It is important that we indicate that is the most likely cause of the polycythemia.  We also discussed the possibility of decreasing the dose of testosterone and of stopping it altogether.    Controlled substance-he understands this is a  controlled substance and as such is regulated under the state.  I cannot refill it outside the prescribed window.  I stressed the importance of follow-up and appropriate laboratory parameters monitoring.    Liver function tests-according to the AUA guidelines we will not check liver functions due to the fact this is not an oral alkylated testosterone.    Condyloma-2 tiny satellite lesions on the shaft of his penis which were fulgurated          This document has been electronically signed by PEÑA BRITTON MD May 13, 2024 14:40 EDT    Dictated Utilizing Dragon Dictation: Part of this note may be an electronic transcription/translation of spoken language to printed text using the Dragon Dictation System.

## 2024-05-14 LAB
DEPRECATED RDW RBC AUTO: 44.2 FL (ref 37–54)
ERYTHROCYTE [DISTWIDTH] IN BLOOD BY AUTOMATED COUNT: 13.1 % (ref 12.3–15.4)
ESTRADIOL SERPL HS-MCNC: 30.6 PG/ML
HCT VFR BLD AUTO: 51.7 % (ref 37.5–51)
HGB BLD-MCNC: 17.7 G/DL (ref 13–17.7)
MCH RBC QN AUTO: 31.9 PG (ref 26.6–33)
MCHC RBC AUTO-ENTMCNC: 34.2 G/DL (ref 31.5–35.7)
MCV RBC AUTO: 93.3 FL (ref 79–97)
PLATELET # BLD AUTO: 324 10*3/MM3 (ref 140–450)
PMV BLD AUTO: 10.5 FL (ref 6–12)
PSA SERPL-MCNC: 0.88 NG/ML (ref 0–4)
RBC # BLD AUTO: 5.54 10*6/MM3 (ref 4.14–5.8)
TESTOST SERPL-MCNC: 1276 NG/DL (ref 193–740)
WBC NRBC COR # BLD AUTO: 6.98 10*3/MM3 (ref 3.4–10.8)

## 2024-08-03 DIAGNOSIS — R79.89 LOW TESTOSTERONE: ICD-10-CM

## 2024-08-05 RX ORDER — ANASTROZOLE 1 MG/1
1 TABLET ORAL WEEKLY
Qty: 12 TABLET | Refills: 3 | Status: SHIPPED | OUTPATIENT
Start: 2024-08-05

## 2024-11-11 ENCOUNTER — OFFICE VISIT (OUTPATIENT)
Dept: UROLOGY | Facility: CLINIC | Age: 54
End: 2024-11-11
Payer: COMMERCIAL

## 2024-11-11 VITALS
WEIGHT: 220 LBS | BODY MASS INDEX: 30.8 KG/M2 | HEART RATE: 70 BPM | SYSTOLIC BLOOD PRESSURE: 187 MMHG | HEIGHT: 71 IN | DIASTOLIC BLOOD PRESSURE: 94 MMHG

## 2024-11-11 DIAGNOSIS — N42.9 DISORDER OF PROSTATE: ICD-10-CM

## 2024-11-11 DIAGNOSIS — R79.89 LOW TESTOSTERONE: Primary | ICD-10-CM

## 2024-11-11 PROCEDURE — 99214 OFFICE O/P EST MOD 30 MIN: CPT | Performed by: UROLOGY

## 2024-11-11 RX ORDER — TESTOSTERONE CYPIONATE 200 MG/ML
INJECTION, SOLUTION INTRAMUSCULAR
Qty: 10 ML | Refills: 3 | Status: SHIPPED | OUTPATIENT
Start: 2024-11-11

## 2024-11-11 NOTE — PROGRESS NOTES
"Chief Complaint:      Chief Complaint   Patient presents with    Low Testosterone      6 month        HPI:   53 y.o. male patient returns today for follow-up.  He has been on testosterone replacement therapy.  He reports a dramatic improvement in his FLORENCIO questionnaire: -FLORENCIO-androgen deficiency in the age male questionnaire. The patient was queried regarding the androgen deficiency in the age male questionnaire.  This is a validated questionnaire that was performed on a set of 314 [place] male physicians. When it was positive it correlated directly with a 94% chance of low testosterone.  Patient indicates there is a decrease in libido or sex drive, a lack of energy, decreased  strength and endurance, a decreased \"enjoyment of life\", sad and grumpy feelings with significant difficulty maintaining erections.  There has also been a recent deterioration regarding work performance. He reports weight loss.  He has good facility and the use of subcutaneous and intramuscular injections as well as comfort level and using the medication in a sterile fashion.  He understands he should use only the prescribed dose.  He is here for appropriate lab monitoring regarding this.  He understands this is a controlled substance and therefore must be watched closely, will not be refilled in the medical loss or miscalculation of the dose.  He is very happy with the treatment and therefore wants to continue it.    Past Medical History:     Past Medical History:   Diagnosis Date    Anxiety     Chronic pain disorder     back pain    Depression     Substance abuse        Current Meds:     Current Outpatient Medications   Medication Sig Dispense Refill    anastrozole (ARIMIDEX) 1 MG tablet TAKE 1 TABLET BY MOUTH ONCE PER WEEK 12 tablet 3    buprenorphine-naloxone (SUBOXONE) 8-2 MG per SL tablet Place 1 tablet under the tongue Daily.      busPIRone (BUSPAR) 10 MG tablet Take 2 tablets by mouth 4 (Four) Times a Day.      ibuprofen " "(ADVIL,MOTRIN) 800 MG tablet TK 1 T PO  TID PRN  3    Syringe 25G X 5/8\" 3 ML misc Use as directed 2 x weekly 24 each 3    Testosterone Cypionate (DEPOTESTOTERONE CYPIONATE) 200 MG/ML injection INJECT 0.5 ML UNDER SKIN EVERY MONDAY AND THURSDAY 10 mL 3    venlafaxine XR (EFFEXOR-XR) 37.5 MG 24 hr capsule TK ONE C PO  D  0     No current facility-administered medications for this visit.        Allergies:      No Known Allergies     Past Surgical History:     Past Surgical History:   Procedure Laterality Date    BACK SURGERY         Social History:     Social History     Socioeconomic History    Marital status: Single   Tobacco Use    Smoking status: Every Day     Current packs/day: 3.00     Average packs/day: 3.0 packs/day for 25.0 years (75.0 ttl pk-yrs)     Types: Cigarettes    Smokeless tobacco: Former   Vaping Use    Vaping status: Never Used   Substance and Sexual Activity    Alcohol use: Yes     Comment: seldom    Drug use: No     Types: Benzodiazepines, Marijuana, Opium     Comment: methadone    Sexual activity: Defer       Family History:     Family History   Problem Relation Age of Onset    Depression Mother        Review of Systems:     Review of Systems   Constitutional: Negative.    HENT: Negative.     Eyes: Negative.    Respiratory: Negative.     Cardiovascular: Negative.    Gastrointestinal: Negative.    Endocrine: Negative.    Musculoskeletal: Negative.    Allergic/Immunologic: Negative.    Neurological: Negative.    Hematological: Negative.    Psychiatric/Behavioral: Negative.         Physical Exam:     Physical Exam  Vitals and nursing note reviewed.   Constitutional:       Appearance: He is well-developed.   HENT:      Head: Normocephalic and atraumatic.   Eyes:      Conjunctiva/sclera: Conjunctivae normal.      Pupils: Pupils are equal, round, and reactive to light.   Cardiovascular:      Rate and Rhythm: Normal rate and regular rhythm.      Heart sounds: Normal heart sounds.   Pulmonary:      " Effort: Pulmonary effort is normal.      Breath sounds: Normal breath sounds.   Abdominal:      General: Bowel sounds are normal.      Palpations: Abdomen is soft.   Musculoskeletal:         General: Normal range of motion.      Cervical back: Normal range of motion.   Skin:     General: Skin is warm and dry.   Neurological:      Mental Status: He is alert and oriented to person, place, and time.      Deep Tendon Reflexes: Reflexes are normal and symmetric.   Psychiatric:         Behavior: Behavior normal.         Thought Content: Thought content normal.         Judgment: Judgment normal.         I have reviewed the following portions of the patient's history: Allergies, current medications, past family history, past medical history, past social history, past surgical history, problem list, and ROS and confirm it is accurate.    Recent Image (CT and/or KUB):      CT Abdomen and Pelvis: No results found for this or any previous visit.       CT Stone Protocol: No results found for this or any previous visit.       KUB: No results found for this or any previous visit.       Labs (past 3 months):      No visits with results within 3 Month(s) from this visit.   Latest known visit with results is:   Office Visit on 05/13/2024   Component Date Value Ref Range Status    PSA 05/13/2024 0.876  0.000 - 4.000 ng/mL Final    Testosterone, Total 05/13/2024 1,276.00 (H)  193.00 - 740.00 ng/dL Final    Estradiol 05/13/2024 30.6  pg/mL Final    WBC 05/13/2024 6.98  3.40 - 10.80 10*3/mm3 Final    RBC 05/13/2024 5.54  4.14 - 5.80 10*6/mm3 Final    Hemoglobin 05/13/2024 17.7  13.0 - 17.7 g/dL Final    Hematocrit 05/13/2024 51.7 (H)  37.5 - 51.0 % Final    MCV 05/13/2024 93.3  79.0 - 97.0 fL Final    MCH 05/13/2024 31.9  26.6 - 33.0 pg Final    MCHC 05/13/2024 34.2  31.5 - 35.7 g/dL Final    RDW 05/13/2024 13.1  12.3 - 15.4 % Final    RDW-SD 05/13/2024 44.2  37.0 - 54.0 fl Final    MPV 05/13/2024 10.5  6.0 - 12.0 fL Final    Platelets  05/13/2024 324  273 - 450 10*3/mm3 Final        Procedure:       Assessment/Plan:   Low testosterone: Patient is here for follow-up.  Since beginning the medication, he has been very pleased.  He reports a dramatic improvement in his erections, ability to achieve and maintain an erection, improvement in libido, increase in frequency of morning erections, and a noticeable weight loss consistent with the treatment.   He is going to have appropriate safety laboratory parameters checked.   He understands that the new data implicates testosterone with the development of prostate cancer and this is all but been disproven and the medical literature as well as the risks of cardiovascular disease which has actually also been disproven.  He understands that while he is a candidate for topical therapy if he is in contact with children this is not an option because it has been shown to accentuate genitalia development at an early age that is frequently irreversible.  He also understands this it is a controlled substance and as such will not be prescribed without appropriate follow-up and appropriate laboratory investigation.  He understands effects on spermatogenesis including the fact that this is not always completely reversible and not always completely limited his ability to father a child.  He has demonstrated facility in the technique of both intramuscular and subcutaneous injection and has been taught sterility when drawing up the medication.    PSA testing-I am recommending a PSA blood test that stands for prostate specific antigen.  I discussed the pathophysiology of PSA testing indicating its use in the diagnosis and management of prostate cancer.  I discussed the normal range being 0 to 4, but more appropriately being much closer to 0 to 2 in a normal male.  I discussed the fact that after a certain age we don't recommend PSA testing especially in view of numerous comorbidities, that this will not be a useful test.   I discussed many of the things that can artificially raise PSA including a recent infection, urinary tract infection, and recent sexual intercourse, or even the type of movement such as manipulation of the prostate from riding a bicycle.  After all this is taken into account when the test is reviewed, the most important use of PSA is the velocity measurement.  In other words, the change of PSA with time is a very important factor in the use and that we look for greater than 20% rise over a year to help us make the prediction of prostate cancer.  I also discussed that the use with prostate cancer indicating that after a radical prostatectomy, the PSA should be 0 and any rise indicates an early biochemical recurrence.    Hyperestrogenism-we spoke about the role of estrogen metabolism and breakdown in the  presence of testosterone replacement therapy.  We spoke about how high estradiol levels can interfere with the improvement noted in a man on testosterone as well as significant side effects such as pseudogynecomastia.  We discussed the use of the medication Arimidex used in an off label setting and using a very judicious low-dose fashion to prevent too low of an estradiol which would precipitate bone complications.  Going to check an estradiol level.  He is at higher risk for breast problems due to his replacement    Polycythemia-I am going to check a CBC to rule out hemoglobin changes.  We utilized the American Heart Association guidelines for polycythemia which is a hemoglobin greater than 18 and a hematocrit greater than 54.5.  Recommend therapeutic phlebotomy as the treatment.  It is important that we indicate that is the most likely cause of the polycythemia.  We also discussed the possibility of decreasing the dose of testosterone and of stopping it altogether.    Controlled substance-he understands this is a controlled substance and as such is regulated under the state.  I cannot refill it outside the  prescribed window.  I stressed the importance of follow-up and appropriate laboratory parameters monitoring.    Liver function tests-according to the AUA guidelines we will not check liver functions due to the fact this is not an oral alkylated testosterone            This document has been electronically signed by PEÑA BRITTON MD November 11, 2024 15:15 EST    Dictated Utilizing Dragon Dictation: Part of this note may be an electronic transcription/translation of spoken language to printed text using the Dragon Dictation System.

## 2025-01-28 DIAGNOSIS — R79.89 LOW TESTOSTERONE: ICD-10-CM

## 2025-01-28 RX ORDER — ANASTROZOLE 1 MG/1
1 TABLET ORAL WEEKLY
Qty: 12 TABLET | Refills: 3 | Status: SHIPPED | OUTPATIENT
Start: 2025-01-28

## 2025-05-06 ENCOUNTER — OFFICE VISIT (OUTPATIENT)
Dept: UROLOGY | Facility: CLINIC | Age: 55
End: 2025-05-06
Payer: COMMERCIAL

## 2025-05-06 VITALS
WEIGHT: 224 LBS | BODY MASS INDEX: 31.36 KG/M2 | HEIGHT: 71 IN | SYSTOLIC BLOOD PRESSURE: 158 MMHG | DIASTOLIC BLOOD PRESSURE: 83 MMHG | HEART RATE: 60 BPM

## 2025-05-06 DIAGNOSIS — N42.9 DISORDER OF PROSTATE: Primary | ICD-10-CM

## 2025-05-06 DIAGNOSIS — R79.89 LOW TESTOSTERONE: ICD-10-CM

## 2025-05-06 PROCEDURE — 84403 ASSAY OF TOTAL TESTOSTERONE: CPT | Performed by: UROLOGY

## 2025-05-06 PROCEDURE — 82670 ASSAY OF TOTAL ESTRADIOL: CPT | Performed by: UROLOGY

## 2025-05-06 PROCEDURE — 84153 ASSAY OF PSA TOTAL: CPT | Performed by: UROLOGY

## 2025-05-06 PROCEDURE — 85027 COMPLETE CBC AUTOMATED: CPT | Performed by: UROLOGY

## 2025-05-06 RX ORDER — TESTOSTERONE CYPIONATE 200 MG/ML
INJECTION, SOLUTION INTRAMUSCULAR
Qty: 10 ML | Refills: 3 | Status: SHIPPED | OUTPATIENT
Start: 2025-05-06

## 2025-05-06 NOTE — PROGRESS NOTES
"Chief Complaint:      Chief Complaint   Patient presents with    Low Testosterone      6 month       HPI:   54 y.o. male patient returns today for follow-up.  He has been on testosterone replacement therapy.  He reports a dramatic improvement in his FLORENCIO questionnaire: -FLORENCIO-androgen deficiency in the age male questionnaire. The patient was queried regarding the androgen deficiency in the age male questionnaire.  This is a validated questionnaire that was performed on a set of 314 [place] male physicians. When it was positive it correlated directly with a 94% chance of low testosterone.  Patient indicates there is a decrease in libido or sex drive, a lack of energy, decreased  strength and endurance, a decreased \"enjoyment of life\", sad and grumpy feelings with significant difficulty maintaining erections.  There has also been a recent deterioration regarding work performance. He reports weight loss.  He has good facility and the use of subcutaneous and intramuscular injections as well as comfort level and using the medication in a sterile fashion.  He understands he should use only the prescribed dose.  He is here for appropriate lab monitoring regarding this.  He understands this is a controlled substance and therefore must be watched closely, will not be refilled in the medical loss or miscalculation of the dose.  He is very happy with the treatment and therefore wants to continue it.    Past Medical History:     Past Medical History:   Diagnosis Date    Anxiety     Chronic pain disorder     back pain    Depression     Substance abuse        Current Meds:     Current Outpatient Medications   Medication Sig Dispense Refill    anastrozole (ARIMIDEX) 1 MG tablet TAKE 1 TABLET BY MOUTH ONCE PER WEEK 12 tablet 3    buprenorphine-naloxone (SUBOXONE) 8-2 MG per SL tablet Place 1 tablet under the tongue Daily.      busPIRone (BUSPAR) 10 MG tablet Take 2 tablets by mouth 4 (Four) Times a Day.      ibuprofen " "(ADVIL,MOTRIN) 800 MG tablet TK 1 T PO  TID PRN  3    Syringe 25G X 5/8\" 3 ML misc Use as directed 2 x weekly 24 each 3    Testosterone Cypionate (DEPOTESTOTERONE CYPIONATE) 200 MG/ML injection INJECT 0.5 ML UNDER SKIN EVERY MONDAY AND THURSDAY 10 mL 3    venlafaxine XR (EFFEXOR-XR) 37.5 MG 24 hr capsule TK ONE C PO  D  0     No current facility-administered medications for this visit.        Allergies:      No Known Allergies     Past Surgical History:     Past Surgical History:   Procedure Laterality Date    BACK SURGERY         Social History:     Social History     Socioeconomic History    Marital status: Single   Tobacco Use    Smoking status: Every Day     Current packs/day: 3.00     Average packs/day: 3.0 packs/day for 25.0 years (75.0 ttl pk-yrs)     Types: Cigarettes    Smokeless tobacco: Former   Vaping Use    Vaping status: Never Used   Substance and Sexual Activity    Alcohol use: Yes     Comment: seldom    Drug use: No     Types: Benzodiazepines, Marijuana, Opium     Comment: methadone    Sexual activity: Defer       Family History:     Family History   Problem Relation Age of Onset    Depression Mother        Review of Systems:     Review of Systems   Constitutional: Negative.    HENT: Negative.     Eyes: Negative.    Respiratory: Negative.     Cardiovascular: Negative.    Gastrointestinal: Negative.    Endocrine: Negative.    Musculoskeletal: Negative.    Allergic/Immunologic: Negative.    Neurological: Negative.    Hematological: Negative.    Psychiatric/Behavioral: Negative.         Physical Exam:     Physical Exam  Vitals and nursing note reviewed.   Constitutional:       Appearance: He is well-developed.   HENT:      Head: Normocephalic and atraumatic.   Eyes:      Conjunctiva/sclera: Conjunctivae normal.      Pupils: Pupils are equal, round, and reactive to light.   Cardiovascular:      Rate and Rhythm: Normal rate and regular rhythm.      Heart sounds: Normal heart sounds.   Pulmonary:      " Effort: Pulmonary effort is normal.      Breath sounds: Normal breath sounds.   Abdominal:      General: Bowel sounds are normal.      Palpations: Abdomen is soft.   Musculoskeletal:         General: Normal range of motion.      Cervical back: Normal range of motion.   Skin:     General: Skin is warm and dry.   Neurological:      Mental Status: He is alert and oriented to person, place, and time.      Deep Tendon Reflexes: Reflexes are normal and symmetric.   Psychiatric:         Behavior: Behavior normal.         Thought Content: Thought content normal.         Judgment: Judgment normal.         I have reviewed the following portions of the patient's history: Allergies, current medications, past family history, past medical history, past social history, past surgical history, problem list, and ROS and confirm it is accurate.    Recent Image (CT and/or KUB):      CT Abdomen and Pelvis: No results found for this or any previous visit.       CT Stone Protocol: No results found for this or any previous visit.       KUB: No results found for this or any previous visit.       Labs (past 3 months):      No visits with results within 3 Month(s) from this visit.   Latest known visit with results is:   Office Visit on 05/13/2024   Component Date Value Ref Range Status    PSA 05/13/2024 0.876  0.000 - 4.000 ng/mL Final    Testosterone, Total 05/13/2024 1,276.00 (H)  193.00 - 740.00 ng/dL Final    Estradiol 05/13/2024 30.6  pg/mL Final    WBC 05/13/2024 6.98  3.40 - 10.80 10*3/mm3 Final    RBC 05/13/2024 5.54  4.14 - 5.80 10*6/mm3 Final    Hemoglobin 05/13/2024 17.7  13.0 - 17.7 g/dL Final    Hematocrit 05/13/2024 51.7 (H)  37.5 - 51.0 % Final    MCV 05/13/2024 93.3  79.0 - 97.0 fL Final    MCH 05/13/2024 31.9  26.6 - 33.0 pg Final    MCHC 05/13/2024 34.2  31.5 - 35.7 g/dL Final    RDW 05/13/2024 13.1  12.3 - 15.4 % Final    RDW-SD 05/13/2024 44.2  37.0 - 54.0 fl Final    MPV 05/13/2024 10.5  6.0 - 12.0 fL Final    Platelets  05/13/2024 324  032 - 450 10*3/mm3 Final        Procedure:       Assessment/Plan:   Low testosterone: Patient is here for follow-up.  Since beginning the medication, he has been very pleased.  He reports a dramatic improvement in his erections, ability to achieve and maintain an erection, improvement in libido, increase in frequency of morning erections, and a noticeable weight loss consistent with the treatment.   He is going to have appropriate safety laboratory parameters checked.   He understands that the new data implicates testosterone with the development of prostate cancer and this is all but been disproven and the medical literature as well as the risks of cardiovascular disease which has actually also been disproven.  He understands that while he is a candidate for topical therapy if he is in contact with children this is not an option because it has been shown to accentuate genitalia development at an early age that is frequently irreversible.  He also understands this it is a controlled substance and as such will not be prescribed without appropriate follow-up and appropriate laboratory investigation.  He understands effects on spermatogenesis including the fact that this is not always completely reversible and not always completely limited his ability to father a child.  He has demonstrated facility in the technique of both intramuscular and subcutaneous injection and has been taught sterility when drawing up the medication.    PSA testing-I am recommending a PSA blood test that stands for prostate specific antigen.  I discussed the pathophysiology of PSA testing indicating its use in the diagnosis and management of prostate cancer.  I discussed the normal range being 0 to 4, but more appropriately being much closer to 0 to 2 in a normal male.  I discussed the fact that after a certain age we don't recommend PSA testing especially in view of numerous comorbidities, that this will not be a useful test.   I discussed many of the things that can artificially raise PSA including a recent infection, urinary tract infection, and recent sexual intercourse, or even the type of movement such as manipulation of the prostate from riding a bicycle.  After all this is taken into account when the test is reviewed, the most important use of PSA is the velocity measurement.  In other words, the change of PSA with time is a very important factor in the use and that we look for greater than 20% rise over a year to help us make the prediction of prostate cancer.  I also discussed that the use with prostate cancer indicating that after a radical prostatectomy, the PSA should be 0 and any rise indicates an early biochemical recurrence.    Hyperestrogenism-we spoke about the role of estrogen metabolism and breakdown in the  presence of testosterone replacement therapy.  We spoke about how high estradiol levels can interfere with the improvement noted in a man on testosterone as well as significant side effects such as pseudogynecomastia.  We discussed the use of the medication Arimidex used in an off label setting and using a very judicious low-dose fashion to prevent too low of an estradiol which would precipitate bone complications.  Going to check an estradiol level.  He is at higher risk for breast problems due to his replacement    Polycythemia-I am going to check a CBC to rule out hemoglobin changes.  We utilized the American Heart Association guidelines for polycythemia which is a hemoglobin greater than 18 and a hematocrit greater than 54.5.  Recommend therapeutic phlebotomy as the treatment.  It is important that we indicate that is the most likely cause of the polycythemia.  We also discussed the possibility of decreasing the dose of testosterone and of stopping it altogether. We also discussed the concomitant diagnosis of sleep apnea in patients with polycythemia in the range of 50% and spoke about sleep  studies.    Controlled substance-he understands this is a controlled substance and as such is regulated under the state.  I cannot refill it outside the prescribed window.  I stressed the importance of follow-up and appropriate laboratory parameters monitoring.    Liver function tests-according to the AUA guidelines we will not check liver functions due to the fact this is not an oral alkylated testosterone            This document has been electronically signed by PEÑA BRITTON MD May 6, 2025 15:09 EDT    Dictated Utilizing Dragon Dictation: Part of this note may be an electronic transcription/translation of spoken language to printed text using the Dragon Dictation System.

## 2025-05-07 LAB
DEPRECATED RDW RBC AUTO: 42.4 FL (ref 37–54)
ERYTHROCYTE [DISTWIDTH] IN BLOOD BY AUTOMATED COUNT: 12.1 % (ref 12.3–15.4)
ESTRADIOL SERPL HS-MCNC: 13.4 PG/ML
HCT VFR BLD AUTO: 49.7 % (ref 37.5–51)
HGB BLD-MCNC: 16.8 G/DL (ref 13–17.7)
MCH RBC QN AUTO: 32.4 PG (ref 26.6–33)
MCHC RBC AUTO-ENTMCNC: 33.8 G/DL (ref 31.5–35.7)
MCV RBC AUTO: 95.8 FL (ref 79–97)
PLATELET # BLD AUTO: 269 10*3/MM3 (ref 140–450)
PMV BLD AUTO: 10.5 FL (ref 6–12)
PSA SERPL-MCNC: 0.85 NG/ML (ref 0–4)
RBC # BLD AUTO: 5.19 10*6/MM3 (ref 4.14–5.8)
TESTOST SERPL-MCNC: 1458 NG/DL (ref 193–740)
WBC NRBC COR # BLD AUTO: 5.81 10*3/MM3 (ref 3.4–10.8)

## 2025-07-21 DIAGNOSIS — R79.89 LOW TESTOSTERONE: ICD-10-CM

## 2025-07-21 RX ORDER — TESTOSTERONE CYPIONATE 200 MG/ML
INJECTION, SOLUTION INTRAMUSCULAR
Qty: 10 ML | OUTPATIENT
Start: 2025-07-21

## 2025-07-22 ENCOUNTER — TELEPHONE (OUTPATIENT)
Dept: UROLOGY | Facility: CLINIC | Age: 55
End: 2025-07-22
Payer: COMMERCIAL

## 2025-07-22 NOTE — TELEPHONE ENCOUNTER
PA for Testosterone has been sent to pt's insurance company and APPROVED!!          Approved today by [facility] 2017  Your PA request has been approved. Additional information will be provided in the approval communication.  Effective Date: 7/22/2025  Authorization Expiration Date: 7/21/2026  Drug  Testosterone Cypionate 200MG/ML intramuscular solution  ePA cloud logo  Form  [facility] Electronic PA Form (2017 [employer])  Original Claim Info  75 PRECERT REQ BY MD;MD CALL FOR REVIEW [phone] REQUIRES PRIOR AUTHORIZATION